# Patient Record
Sex: FEMALE | Race: WHITE | NOT HISPANIC OR LATINO | Employment: FULL TIME | URBAN - METROPOLITAN AREA
[De-identification: names, ages, dates, MRNs, and addresses within clinical notes are randomized per-mention and may not be internally consistent; named-entity substitution may affect disease eponyms.]

---

## 2017-02-03 ENCOUNTER — ALLSCRIPTS OFFICE VISIT (OUTPATIENT)
Dept: OTHER | Facility: OTHER | Age: 42
End: 2017-02-03

## 2017-02-03 ENCOUNTER — APPOINTMENT (OUTPATIENT)
Dept: LAB | Facility: HOSPITAL | Age: 42
End: 2017-02-03
Payer: COMMERCIAL

## 2017-02-03 DIAGNOSIS — R68.89 OTHER GENERAL SYMPTOMS AND SIGNS: ICD-10-CM

## 2017-02-03 LAB
FLUAV AG SPEC QL IA: NEGATIVE
FLUAV AG SPEC QL: NORMAL
FLUBV AG SPEC QL: NORMAL
INFLUENZA B AG (HISTORICAL): NEGATIVE
RSV B RNA SPEC QL NAA+PROBE: NORMAL

## 2017-02-03 PROCEDURE — 87798 DETECT AGENT NOS DNA AMP: CPT

## 2017-02-06 ENCOUNTER — GENERIC CONVERSION - ENCOUNTER (OUTPATIENT)
Dept: OTHER | Facility: OTHER | Age: 42
End: 2017-02-06

## 2017-03-21 ENCOUNTER — GENERIC CONVERSION - ENCOUNTER (OUTPATIENT)
Dept: OTHER | Facility: OTHER | Age: 42
End: 2017-03-21

## 2017-03-22 ENCOUNTER — ALLSCRIPTS OFFICE VISIT (OUTPATIENT)
Dept: OTHER | Facility: OTHER | Age: 42
End: 2017-03-22

## 2017-03-22 LAB
BACTERIA UR QL AUTO: NORMAL
CLUE CELL (HISTORICAL): NORMAL
HYPHAL YEAST (HISTORICAL): NORMAL
KOH PREP (HISTORICAL): NORMAL
PH UR STRIP.AUTO: 5.5 [PH]
TRICHOMONAS (HISTORICAL): NORMAL
YEAST (HISTORICAL): NORMAL

## 2017-11-07 ENCOUNTER — LAB REQUISITION (OUTPATIENT)
Dept: LAB | Facility: HOSPITAL | Age: 42
End: 2017-11-07
Payer: COMMERCIAL

## 2017-11-07 ENCOUNTER — ALLSCRIPTS OFFICE VISIT (OUTPATIENT)
Dept: OTHER | Facility: OTHER | Age: 42
End: 2017-11-07

## 2017-11-07 DIAGNOSIS — Z01.419 ENCOUNTER FOR GYNECOLOGICAL EXAMINATION WITHOUT ABNORMAL FINDING: ICD-10-CM

## 2017-11-07 DIAGNOSIS — Z11.51 ENCOUNTER FOR SCREENING FOR HUMAN PAPILLOMAVIRUS (HPV): ICD-10-CM

## 2017-11-07 LAB
BACTERIA UR QL AUTO: NORMAL
CLUE CELL (HISTORICAL): NORMAL
HYPHAL YEAST (HISTORICAL): NORMAL
KOH PREP (HISTORICAL): NORMAL
PH UR STRIP.AUTO: 4.5 [PH]
TRICHOMONAS (HISTORICAL): NORMAL
YEAST (HISTORICAL): NORMAL

## 2017-11-07 PROCEDURE — 87624 HPV HI-RISK TYP POOLED RSLT: CPT | Performed by: PHYSICIAN ASSISTANT

## 2017-11-07 PROCEDURE — G0145 SCR C/V CYTO,THINLAYER,RESCR: HCPCS | Performed by: PHYSICIAN ASSISTANT

## 2017-11-09 LAB — HPV RRNA GENITAL QL NAA+PROBE: NORMAL

## 2017-11-13 LAB
LAB AP GYN PRIMARY INTERPRETATION: NORMAL
LAB AP LMP: NORMAL
Lab: NORMAL

## 2017-11-23 NOTE — PROGRESS NOTES
Assessment  1  Encounter for gynecological examination without abnormal finding (V72 31) (Z01 419)   2  Vaginal irritation (623 9) (N89 8)    Plan  Encounter for screening mammogram for malignant neoplasm of breast    · * MAMMO SCREENING BILATERAL W CAD; Status:Active; Requested for:15Bxr4236;    Perform:[facility]; IAJ:36WJX2491; Last Updated By:Aaliyah Paul; 11/7/2017 9:54:23 AM;Ordered;For:Encounter for screening mammogram for malignant neoplasm of breast; Ordered By:Lukens, Vivien Hamman;  Vaginal irritation    · 97 Karmen Frankel; Status:Complete;   Done: 78MGZ5801 09:20AM   Performed: In Office; BBK:05NGV7319;GHUAYZM; Today;irritation; Ordered By:Lukens, Vivien Hamman; Discussion/Summary  healthy adult female the risks and benefits of cervical cancer screening were discussed HPV and Pap Co-testing Done Today Breast cancer screening: the risks and benefits of breast cancer screening were discussed, monthly self breast exam was advised and mammogram has been ordered  Advice and education were given regarding weight bearing exercise, calcium supplements and vitamin D supplements  Chief Complaint  Pt is her yearly exam, c/o vaginal irritation and discharge  History of Present Illness  HPI: 36year old white female here for yearly exam, no complaints  She does note off and on vaginal irritation  She has regular periods that are not heavy or crampy  She uses no contraception and is happy with this  She says it took a long time to get pregnant with her children  GYN HM, Adult Female [facility]: The patient is being seen for a health maintenance evaluation  The last health maintenance visit was 1 year(s) ago  General Health: The patient's health since the last visit is described as good  Lifestyle:  She consumes a diverse and healthy diet  -- She does not have any weight concerns  -- She does not exercise regularly  -- She does not use tobacco -- She consumes alcohol  She reports occasional alcohol use  -- She denies drug use  Reproductive health: the patient is premenopausal--   she reports no menstrual problems  -- she uses contraception  For contraception, she uses withdrawal -- she is sexually active  -- pregnancy history: G 2P 2,-- 2    Screening: Cervical cancer screening includes a pap smear performed 2/19/2014,neg  -- and-- human papilloma virus screening performed 2/19/2014,neg  Breast cancer screening includes a mammogram performed 12/27/2016 WNL  Colorectal cancer screening includes no previous colonoscopy  Active Problems    1  Abnormal blood chemistry (790 6) (R79 9)   2  Allergic rhinitis (477 9) (J30 9)   3  Anxiety disorder (300 00) (F41 9)   4  Atypical chest pain (786 59) (R07 89)   5  Blepharitis, unspecified laterality   6  Cervicalgia (723 1) (M54 2)   7  Classic migraine with aura (346 00) (G43 109)   8  Depression with anxiety (300 4) (F41 8)   9  Encounter for gynecological examination without abnormal finding (V72 31) (Z01 419)   10  Encounter for screening mammogram for malignant neoplasm of breast (V76 12)  (Z12 31)   11  History of allergy (V15 09) (Z88 9)   12  Hypothyroidism (244 9) (E03 9)   13  Insomnia (780 52) (G47 00)   14  Intolerance to cold (780 99) (R68 89)   15  Nonspecific abnormal results of endocrine function study (794 6) (R94 7)   16  Simple goiter (240 0) (E04 0)   17  Tension type headache (339 10) (G44 209)   18   Vertigo (780 4) (R42)    Past Medical History   · History of Age At First Period 14 Years Old (Menarche)   · History of Anxiety (300 00) (F41 9)   · History of Atypical chest pain (786 59) (R07 89)   · History of Colposcopy Cervix With Biopsy(S)   · History of Dysplastic Nevus (238 2)   · History of allergy (V15 09) (Z88 9)   · History of dermatitis (V13 3) (Z87 2)   · History of hypothyroidism (V12 29) (Z86 39)   · History of menorrhagia (V13 29) (Z87 42)   · History of nausea (V12 79) (S71 554)   · History of sinusitis (V12 69) (Z87 09)   · History of vertigo (V12 49) (Z87 898)   · History of Menometrorrhagia (626 2) (N92 1)   · History of Nits / Moving Insects In Hair   · Normal delivery (650) (O80,Z37  9)   · History of Previously Pregnant With 2  Normal Vaginal Deliveries   · History of Simple goiter (240 0) (E04 0)   · History of Summary Of Previous Pregnancies  2  (Total No )   · History of Vaginal candidiasis (112 1) (B37 3)    Surgical History   · History of Biopsy Breast Percutaneous Needle Core   · History of Biopsy Skin Each Additional Lesion   · History of Endometrial Biopsy By Suction   · History of Left Breast Lumpectomy    Family History  Mother    · Family history of Subarachnoid Hemorrhage  Maternal Grandmother    · Family history of Breast Cancer (V16 3)   · Family history of Heart Disease (V17 49)  Maternal Grandfather    · Family history of Colon Cancer (V16 0)   · Family history of Lung Cancer (V16 1)  Paternal Grandfather    · Family history of Acute Myocardial Infarction (V17 3)    Social History     · Being A Social Drinker   · Birth Control Not Practiced   · Caffeine Use   · Denied: History of Drug Use   · Exercising Regularly   · Never A Smoker   · Sexually Active   · Withdrawal contraception    Current Meds   1  Acidophilus Probiotic TABS; Therapy: (Recorded:33Qto3917) to Recorded   2  Clindesse 2 % Vaginal Cream; INSERT 1 APPLICATORFUL INTRAVAGINALLY AT BEDTIME; Therapy: 48OBZ1530 to (Last Rx:2017)  Requested for: 2017 Ordered   3  ClonazePAM 0 5 MG Oral Tablet; one tablet in the morning, one tablet in the afternoon, 2 tablets at HS; Therapy: 84UPI6968 to (Last Rx:2017) Ordered   4  Fluconazole 200 MG Oral Tablet; TAKE 2 TABLET ONCE; Therapy: 63LGS5489 to (Evaluate:2017)  Requested for: 2017; Last Rx:2017 Ordered   5  PARoxetine HCl - 20 MG Oral Tablet; Take 1/2 tablet in the morning and 1 tablet at hs; Therapy: 57DZA1584 to (Last Rx:2017) Ordered   6   RisperiDONE 0 5 MG Oral Tablet; TAKE 1 TABLET AT BEDTIME; Therapy: 86WBP8819 to (Evaluate:05Mar2017); Last Rx:32Ozr0147 Ordered    Allergies  1  No Known Drug Allergies    Vitals   Recorded: 95RPC8092 20:81JL   Systolic 800, LUE, Sitting   Diastolic 70, LUE, Sitting   Height 5 ft 5 5 in   Weight 146 lb    BMI Calculated 23 93   BSA Calculated 1 74   LMP 30Oct2017       Physical Exam   Constitutional  General appearance: No acute distress, well appearing and well nourished  Neck  Neck: Normal, supple, trachea midline, no masses  Genitourinary  External genitalia: Normal and no lesions appreciated  Vagina: Normal, no lesions or dryness appreciated  Urethra: Normal    Urethral meatus: Normal    Bladder: Normal, soft, non-tender and no prolapse or masses appreciated  Cervix: Normal, no palpable masses  Uterus: Normal, non-tender, not enlarged, and no palpable masses  Adnexa/parametria: Normal, non-tender and no fullness or masses appreciated  Chest  Breasts: Normal and no dimpling or skin changes noted  Abdomen  Abdomen: Normal, non-tender, and no organomegaly noted         Results/Data  [facility] [id number] 09:20AM Master Mendenhall     Test Name Result Flag Reference   BACTERIA neg     CLUE CELL neg     TRICHOMONAS neg     YEAST WITH HYPHAE neg     YEAST neg     [place] 30 UA 4 5     KOH PREP neg whiff         Future Appointments    Date/Time Provider Specialty Site   11/08/2018 08:20 AM Master Mendenhall [facility] Obstetrics/Gynecology [facility]       Signatures   Electronically signed by : Ameya Rosales [facility]; Nov 7 2017  9:21AM EST                       (Author)    Electronically signed by : PONCHO Stafford ; Nov 22 2017 10:37AM EST                       (Author)

## 2017-12-27 DIAGNOSIS — Z12.31 ENCOUNTER FOR SCREENING MAMMOGRAM FOR MALIGNANT NEOPLASM OF BREAST: ICD-10-CM

## 2018-01-04 ENCOUNTER — HOSPITAL ENCOUNTER (OUTPATIENT)
Dept: RADIOLOGY | Facility: HOSPITAL | Age: 43
Discharge: HOME | End: 2018-01-04
Payer: COMMERCIAL

## 2018-01-04 DIAGNOSIS — Z12.31 ENCOUNTER FOR SCREENING MAMMOGRAM FOR MALIGNANT NEOPLASM OF BREAST: ICD-10-CM

## 2018-01-04 PROCEDURE — 77067 SCR MAMMO BI INCL CAD: CPT

## 2018-01-10 NOTE — MISCELLANEOUS
Message   Recorded as Task   Date: 03/21/2017 08:54 AM, Created By: Khadijah Mcarthur   Task Name: Follow Up   Assigned To: Maribel Baxter   Regarding Patient: Maximiliano Corey, Status: Active   Comment:    FerminKylah jacobs - 21 Mar 2017 8:54 AM     TASK CREATED  pt called stating she has a problem with chronic bv and yeast  jane macdonald treated her with diflucan 200mg times 14 days and metronidazole for bv    she states jane told her she will have a problem getting rid of symptoms   s&s began 4 days ago    fishy odor and white clumpy yeast disch  do you want her to come in or just rx    states 2 diflucan never cure symptoms  to [employer] [facility], [address]        Active Problems    1  Abnormal blood chemistry (790 6) (R79 9)   2  Acute sinusitis (461 9) (J01 90)   3  Allergic rhinitis (477 9) (J30 9)   4  Anxiety disorder (300 00) (F41 9)   5  Atypical chest pain (786 59) (R07 89)   6  Blepharitis, unspecified laterality   7  Cervicalgia (723 1) (M54 2)   8  Classic migraine with aura (346 00) (G43 109)   9  Depression with anxiety (300 4) (F41 8)   10  Encounter for gynecological examination without abnormal finding (V72 31) (Z01 419)   11  Encounter for screening mammogram for malignant neoplasm of breast (V76 12)    (Z12 31)   12  Flu-like symptoms (780 99) (R68 89)   13  History of allergy (V15 09) (Z88 9)   14  Hypothyroidism (244 9) (E03 9)   15  Insomnia (780 52) (G47 00)   16  Intolerance to cold (780 99) (R68 89)   17  Irregular menstrual cycle (626 4) (N92 6)   18  Nonspecific abnormal results of endocrine function study (794 6) (R94 7)   19  Palpitations (785 1) (R00 2)   20  Postnasal drip (784 91) (R09 82)   21  Simple goiter (240 0) (E04 0)   22  Tension type headache (339 10) (G44 209)   23  Vertigo (780 4) (R42)    Current Meds   1  Acidophilus Probiotic TABS; Therapy: (Recorded:62Hqw0757) to Recorded   2  ALPRAZolam 0 5 MG Oral Tablet; TAKE 1/2 TO 1 TAB ONCE A DAY AS NEEDED FOR   ANXIETY;    Therapy: 94LRE2456 to (Evaluate:21Mcn2476); Last Rx:24Jun2016 Ordered   3  ClonazePAM 0 5 MG Oral Tablet; one tablet in the morning, one tablet in the afternoon, 2   tablets at HS; Therapy: 30MIB7460 to (Last Rx:62Jgl9570) Ordered   4  Fluconazole 200 MG Oral Tablet (Diflucan); TAKE 2 TABLET ONCE; Therapy: 64RFQ5517 to (Suzette Fess)  Requested for: 13TET8219; Last   Rx:03Feb2017 Ordered   5  PARoxetine HCl - 20 MG Oral Tablet (Paxil); Take 1/2 tablet in the morning and 1 tablet   at hs;   Therapy: 94TWB8085 to (Last Rx:03Feb2017) Ordered   6  RisperiDONE 0 5 MG Oral Tablet; TAKE 1 TABLET AT BEDTIME; Therapy: 23ZZY5136 to (Evaluate:05Mar2017); Last Rx:77Fzo6671 Ordered    Allergies    1   No Known Drug Allergies    Signatures   Electronically signed by : Amina Walden, ; Mar 21 2017  8:54AM EST                       (Author)
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
## 2018-01-11 NOTE — MISCELLANEOUS
Message   Recorded as Task   Date: 03/21/2017 08:54 AM, Created By: Abad Barnes   Task Name: Follow Up   Assigned To: Alethea Simmons   Regarding Patient: Lee Ro, Status: In Progress   Comment:    Carmen Christensene - 21 Mar 2017 8:54 AM     TASK CREATED  pt called stating she has a problem with chronic bv and yeast  jane torres treated her with diflucan 200mg times 14 days and metronidazole for bv    she states jane told her she will have a problem getting rid of symptoms   s&s began 4 days ago    fishy odor and white clumpy yeast disch  do you want her to come in or just rx    states 2 diflucan never cure symptoms  to [facility]s   Kylah Christensen - 21 Mar 2017 8:54 AM     TASK REASSIGNED: Previously Assigned To GEORGINA GYN,Team   Bk Snell - 21 Mar 2017 9:00 AM     TASK REPLIED TO: Previously Assigned To Simran Davidson  last time she was seen her cultures were completely negative  She should come in for cultures  Kylah Christensen - 21 Mar 2017 9:34 AM     TASK IN PROGRESS   Kylah Christensen - 21 Mar 2017 9:35 AM     TASK EDITED  no apts today, pt wants to be seen tomorrow    3pm with bk        Active Problems    1  Abnormal blood chemistry (790 6) (R79 9)   2  Acute sinusitis (461 9) (J01 90)   3  Allergic rhinitis (477 9) (J30 9)   4  Anxiety disorder (300 00) (F41 9)   5  Atypical chest pain (786 59) (R07 89)   6  Blepharitis, unspecified laterality   7  Cervicalgia (723 1) (M54 2)   8  Classic migraine with aura (346 00) (G43 109)   9  Depression with anxiety (300 4) (F41 8)   10  Encounter for gynecological examination without abnormal finding (V72 31) (Z01 419)   11  Encounter for screening mammogram for malignant neoplasm of breast (V76 12)    (Z12 31)   12  Flu-like symptoms (780 99) (R68 89)   13  History of allergy (V15 09) (Z88 9)   14  Hypothyroidism (244 9) (E03 9)   15  Insomnia (780 52) (G47 00)   16  Intolerance to cold (780 99) (R68 89)   17   Irregular menstrual cycle (626  4) (N92 6)   18  Nonspecific abnormal results of endocrine function study (794 6) (R94 7)   19  Palpitations (785 1) (R00 2)   20  Postnasal drip (784 91) (R09 82)   21  Simple goiter (240 0) (E04 0)   22  Tension type headache (339 10) (G44 209)   23  Vertigo (780 4) (R42)    Current Meds   1  Acidophilus Probiotic TABS; Therapy: (Recorded:74Huz8715) to Recorded   2  ALPRAZolam 0 5 MG Oral Tablet; TAKE 1/2 TO 1 TAB ONCE A DAY AS NEEDED FOR   ANXIETY; Therapy: 60NYR1771 to (Evaluate:60Bof7888); Last Rx:24Jun2016 Ordered   3  ClonazePAM 0 5 MG Oral Tablet; one tablet in the morning, one tablet in the afternoon, 2   tablets at HS; Therapy: 67YGG1011 to (Last Rx:01Jme4933) Ordered   4  Fluconazole 200 MG Oral Tablet (Diflucan); TAKE 2 TABLET ONCE; Therapy: 18ESO6644 to (Shaun Simon)  Requested for: 81UQW2492; Last   Rx:55Llr0817 Ordered   5  PARoxetine HCl - 20 MG Oral Tablet (Paxil); Take 1/2 tablet in the morning and 1 tablet   at hs;   Therapy: 18KST5231 to (Last Rx:57Qoj8065) Ordered   6  RisperiDONE 0 5 MG Oral Tablet; TAKE 1 TABLET AT BEDTIME; Therapy: 19JCN2852 to (Evaluate:05Mar2017); Last Rx:37Zck3910 Ordered    Allergies    1   No Known Drug Allergies    Signatures   Electronically signed by : Caprice Noland, ; Mar 21 2017  9:35AM EST                       (Author)

## 2018-01-12 VITALS
HEIGHT: 66 IN | DIASTOLIC BLOOD PRESSURE: 70 MMHG | SYSTOLIC BLOOD PRESSURE: 120 MMHG | WEIGHT: 146 LBS | BODY MASS INDEX: 23.46 KG/M2

## 2018-01-13 VITALS — DIASTOLIC BLOOD PRESSURE: 70 MMHG | SYSTOLIC BLOOD PRESSURE: 110 MMHG | WEIGHT: 142 LBS | BODY MASS INDEX: 22.92 KG/M2

## 2018-01-13 NOTE — MISCELLANEOUS
Message  Return to work or school:   Kraig Shah is under my professional care  She was seen in my office on 11/8/17   She is able to return to work on  11/8/17      Joe Chambers PAC          Signatures   Electronically signed by : Joe Chambers, [facility]; Nov 7 2017  9:33AM EST                       (Author)

## 2018-01-14 VITALS
HEART RATE: 64 BPM | SYSTOLIC BLOOD PRESSURE: 102 MMHG | DIASTOLIC BLOOD PRESSURE: 70 MMHG | TEMPERATURE: 98.9 F | WEIGHT: 134 LBS | BODY MASS INDEX: 21.53 KG/M2 | HEIGHT: 66 IN | RESPIRATION RATE: 16 BRPM

## 2018-01-14 NOTE — RESULT NOTES
Verified Results  (1) INFLUENZA A/B AND RSV, PCR 32Exn0766 05:16PM [facility] Order Number: [id number]     Test Name Result Flag Reference   INFLUENZA A/MATRIX None Detected  None Detected   INFLUENZA B None Detected  None Detected   RESP SYNCYTIAL VIRUS None Detected  None Detected

## 2018-07-02 ENCOUNTER — OFFICE VISIT (OUTPATIENT)
Dept: FAMILY MEDICINE CLINIC | Facility: CLINIC | Age: 43
End: 2018-07-02
Payer: COMMERCIAL

## 2018-07-02 VITALS
WEIGHT: 152.4 LBS | HEART RATE: 72 BPM | TEMPERATURE: 99.5 F | SYSTOLIC BLOOD PRESSURE: 120 MMHG | RESPIRATION RATE: 12 BRPM | BODY MASS INDEX: 24.97 KG/M2 | DIASTOLIC BLOOD PRESSURE: 88 MMHG

## 2018-07-02 DIAGNOSIS — L55.9 SUNBURN: Primary | ICD-10-CM

## 2018-07-02 PROCEDURE — 99213 OFFICE O/P EST LOW 20 MIN: CPT | Performed by: NURSE PRACTITIONER

## 2018-07-02 RX ORDER — CLONAZEPAM 0.5 MG/1
TABLET ORAL
COMMUNITY
Start: 2017-02-03

## 2018-07-02 RX ORDER — FLUCONAZOLE 200 MG/1
2 TABLET ORAL
COMMUNITY
Start: 2017-02-03 | End: 2018-07-02 | Stop reason: ALTCHOICE

## 2018-07-02 RX ORDER — RISPERIDONE 0.5 MG/1
1 TABLET, FILM COATED ORAL
COMMUNITY
Start: 2017-02-03

## 2018-07-02 RX ORDER — PAROXETINE HYDROCHLORIDE 40 MG/1
TABLET, FILM COATED ORAL
Refills: 0 | COMMUNITY
Start: 2018-05-14 | End: 2019-12-20 | Stop reason: ALTCHOICE

## 2018-07-02 NOTE — LETTER
July 2, 2018     Patient: Herb Warren   YOB: 1975   Date of Visit: 7/2/2018       To Whom it May Concern:    Luis Bashir is under my professional care  She was seen in my office on 7/2/2018  She may return to work on 7/3/18  If you have any questions or concerns, please don't hesitate to call           Sincerely,          ESTEVAN Restrepo        CC: No Recipients

## 2018-07-02 NOTE — PROGRESS NOTES
Chief Complaint   Patient presents with    Sunburn     Sunburn and rash on chest, shoulders, neck, and back since last 2 days  Assessment/Plan:    Sunburn-  Apply Aloe or Calamine Lotion OTC, cool compresses prn   Clean areas daily with gentle soap and water  Avoid hot showers  Avoid direct sunlight (use SPF 30 at least when in direct sunlight or keep body covered)  OTC Ibuprofen prn   Call if symptoms worsen or persist      Diagnoses and all orders for this visit:    Sunburn          Subjective:      Patient ID: Annabelle Muro is a 36 y o  female  HPI   Pt presents by herself today for an acute visit     C/O having sunburn on her upper chest, shoulders and upper back  Was at the beach last [place], lots of direct sunlight  She was applying sunscreen (SPF 30), didn't feel like she was burning  Had small blisters that developed over this past weekend  Skin now peeling in above mentioned areas   No fevers, chills, drainage     Has been applying her regular body lotion to her skin  Has not tried Aloe       The following portions of the patient's history were reviewed and updated as appropriate: allergies, current medications, past medical history, past social history and problem list     Review of Systems   Constitutional: Negative for chills, diaphoresis, fatigue and fever  Respiratory: Negative for chest tightness, shortness of breath and wheezing  Cardiovascular: Negative for chest pain, palpitations and leg swelling  Gastrointestinal: Negative for abdominal pain, diarrhea, nausea and vomiting  Skin: Positive for rash  Neurological: Negative for dizziness  Hematological: Negative for adenopathy  Objective:      /88 (BP Location: Left arm, Patient Position: Sitting, Cuff Size: Standard)   Pulse 72   Temp 99 5 °F (37 5 °C) (Tympanic)   Resp 12   Wt 69 1 kg (152 lb 6 4 oz)   BMI 24 97 kg/m²          Physical Exam   Constitutional: She is oriented to person, place, and time   She appears well-developed and well-nourished  No distress  HENT:   Head: Normocephalic and atraumatic  Eyes: Pupils are equal, round, and reactive to light  Cardiovascular: Normal rate, regular rhythm and normal heart sounds  No murmur heard  Pulmonary/Chest: Effort normal and breath sounds normal  No respiratory distress  She has no wheezes  Neurological: She is alert and oriented to person, place, and time  Skin: Skin is warm and dry  She is not diaphoretic  Sunburn, peeling skin to upper chest, B/L shoulders (anterior and posteriorly) and upper mid back thoracic back   No blistering or drainage   No signs of infection    Psychiatric: She has a normal mood and affect

## 2018-07-02 NOTE — PATIENT INSTRUCTIONS
Apply Aloe or Calamine Lotion OTC, cool compresses    Clean areas daily with gentle soap and water  Avoid hot showers  Avoid direct sunlight (use SPF 30 at least when in direct sunlight or keep body covered)  OTC Ibuprofen if needed  Call if symptoms worsen or persist

## 2018-11-08 ENCOUNTER — ANNUAL EXAM (OUTPATIENT)
Dept: OBGYN CLINIC | Facility: CLINIC | Age: 43
End: 2018-11-08
Payer: COMMERCIAL

## 2018-11-08 VITALS
DIASTOLIC BLOOD PRESSURE: 72 MMHG | BODY MASS INDEX: 27.99 KG/M2 | WEIGHT: 168 LBS | HEIGHT: 65 IN | SYSTOLIC BLOOD PRESSURE: 110 MMHG

## 2018-11-08 DIAGNOSIS — R92.2 DENSE BREAST TISSUE: ICD-10-CM

## 2018-11-08 DIAGNOSIS — Z01.419 ENCOUNTER FOR GYNECOLOGICAL EXAMINATION WITHOUT ABNORMAL FINDING: ICD-10-CM

## 2018-11-08 DIAGNOSIS — Z12.31 ENCOUNTER FOR SCREENING MAMMOGRAM FOR MALIGNANT NEOPLASM OF BREAST: Primary | ICD-10-CM

## 2018-11-08 PROCEDURE — S0612 ANNUAL GYNECOLOGICAL EXAMINA: HCPCS | Performed by: PHYSICIAN ASSISTANT

## 2018-11-08 RX ORDER — DIPHENOXYLATE HYDROCHLORIDE AND ATROPINE SULFATE 2.5; .025 MG/1; MG/1
1 TABLET ORAL DAILY
COMMUNITY

## 2018-11-08 NOTE — PROGRESS NOTES
Cristhian Adrian  1975      CC:  Yearly exam    S:  39 y o  female here for yearly exam  Her cycles are regular, not heavy or crampy  She is sexually active  She uses nothing for contraception and she is happy with this  Her 15year old son recently underwent bowel resection and appendectomy for his Crohn's disease  Last Pap 11/7/2017 neg/neg  Last Mammo 1/4/18 neg      Current Outpatient Prescriptions:     clonazePAM (KlonoPIN) 0 5 mg tablet, Take by mouth, Disp: , Rfl:     multivitamin (THERAGRAN) TABS, Take 1 tablet by mouth daily, Disp: , Rfl:     PARoxetine (PAXIL) 40 MG tablet, TAKE 1/2 BY MOUTH TWICE A DAY (PLAN LIMITS TO 30-DS), Disp: , Rfl: 0    risperiDONE (RisperDAL) 0 5 mg tablet, Take 1 tablet by mouth, Disp: , Rfl:   Social History     Social History    Marital status: /Civil Union     Spouse name: N/A    Number of children: N/A    Years of education: N/A     Occupational History    Not on file       Social History Main Topics    Smoking status: Former Smoker    Smokeless tobacco: Former User      Comment: Quit    Alcohol use Yes      Comment: social    Drug use: No    Sexual activity: Yes     Partners: Male     Birth control/ protection: None      Comment: withdrawl method     Other Topics Concern    Not on file     Social History Narrative    Caffeine use    Exercises regularly         Family History   Problem Relation Age of Onset    Subarachnoid hemorrhage Mother     Breast cancer Maternal Grandmother     Heart disease Maternal Grandmother     Colon cancer Maternal Grandfather     Lung cancer Maternal Grandfather     Heart attack Paternal Grandfather     No Known Problems Father     Lung cancer Paternal Grandmother     Alzheimer's disease Paternal [de-identified]     Crohn's disease Son       Past Medical History:   Diagnosis Date    Allergy     Anxiety     Anxiety     Depression     Dermatitis     Dysplastic nevus     Insomnia     Menometrorrhagia     Vertigo         O:  Blood pressure 110/72, height 5' 5" (1 651 m), weight 76 2 kg (168 lb), last menstrual period 10/16/2018  Patient appears well and is not in distress  Neck is supple without masses  Breasts are symmetrical without mass, tenderness, nipple discharge, skin changes or adenopathy  Abdomen is soft and nontender without masses  External genitals are normal without lesions or rashes  Vagina is normal without discharge or bleeding  Cervix is normal without discharge or lesion  Uterus is normal, mobile, nontender without palpable mass  Adnexa are normal, nontender, without palpable mass  A:  Yearly exam      P:   Pap due 2022   Mammo slip provided    RTO one year for yearly exam or sooner as needed

## 2019-03-18 ENCOUNTER — OFFICE VISIT (OUTPATIENT)
Dept: FAMILY MEDICINE CLINIC | Facility: CLINIC | Age: 44
End: 2019-03-18
Payer: COMMERCIAL

## 2019-03-18 VITALS
BODY MASS INDEX: 26.82 KG/M2 | WEIGHT: 161 LBS | HEART RATE: 86 BPM | HEIGHT: 65 IN | TEMPERATURE: 98.8 F | DIASTOLIC BLOOD PRESSURE: 64 MMHG | SYSTOLIC BLOOD PRESSURE: 106 MMHG | OXYGEN SATURATION: 97 % | RESPIRATION RATE: 16 BRPM

## 2019-03-18 DIAGNOSIS — R68.89 FLU-LIKE SYMPTOMS: Primary | ICD-10-CM

## 2019-03-18 PROCEDURE — 1036F TOBACCO NON-USER: CPT | Performed by: NURSE PRACTITIONER

## 2019-03-18 PROCEDURE — 3008F BODY MASS INDEX DOCD: CPT | Performed by: NURSE PRACTITIONER

## 2019-03-18 PROCEDURE — 99213 OFFICE O/P EST LOW 20 MIN: CPT | Performed by: NURSE PRACTITIONER

## 2019-03-18 RX ORDER — OSELTAMIVIR PHOSPHATE 75 MG/1
75 CAPSULE ORAL EVERY 12 HOURS SCHEDULED
Qty: 10 CAPSULE | Refills: 0 | Status: SHIPPED | OUTPATIENT
Start: 2019-03-18 | End: 2019-03-23

## 2019-03-18 RX ORDER — LOXAPINE SUCCINATE 5 MG/1
TABLET ORAL
Refills: 2 | COMMUNITY
Start: 2019-03-08 | End: 2019-11-12 | Stop reason: ALTCHOICE

## 2019-03-18 NOTE — PROGRESS NOTES
Chief Complaint   Patient presents with    Cold Like Symptoms     Nausea, Chills     Health Maintenance   Topic Date Due    BMI: Followup Plan  08/14/1992    DTaP,Tdap,and Td Vaccines (1 - Tdap) 08/14/1995    INFLUENZA VACCINE  03/18/2020 (Originally 7/1/2018)    Depression Screening PHQ  03/18/2020    BMI: Adult  03/18/2020    HEPATITIS B VACCINES  Aged Out     Assessment/Plan:    Flu like symptoms- declined to be swabbed for influenza by PCR  Based off of symptoms and exam today, we will treat with Tamilfu  Increase PO fluids and rest   Warm, salt water gargles, throat lozenges  Saline nasal spray prn  OTC Tylenol prn, max of 3g/24 hours  Humidifier in bedroom  Declined a cough suppressant today  Call office if symptoms worsen or persist     Diagnoses and all orders for this visit:    Flu-like symptoms  -     oseltamivir (TAMIFLU) 75 mg capsule; Take 1 capsule (75 mg total) by mouth every 12 (twelve) hours for 5 days    Other orders  -     loxapine (LOXITANE) 5 mg capsule; TK 1 C PO HS          Subjective:      Patient ID: Jodie Cummings is a 39 y o  female  HPI     Pt presents by herself today for an acute visit   C/o body aches which started yesterday morning   Feeling warm with chills but isn't checking temperatures at home  Last had Tylenol around 2pm (had one tab of the ES)  +nausea and vomiting x 1 yesterday   No nausea today  No abdominal pain   No diarrhea or constipation   +scratchy throat with a dry cough   +dull headache   +nasal congestion   No SOB or wheezing     She did not get her flu vaccine this year    Both kids are sick at home    Non smoker  Denies H/O asthma         The following portions of the patient's history were reviewed and updated as appropriate: allergies, current medications, past medical history, past social history and problem list     Review of Systems   Constitutional: Positive for chills, fatigue and fever  Negative for unexpected weight change     HENT: Positive for congestion and sore throat  Negative for ear pain, hearing loss, postnasal drip, rhinorrhea and sinus pressure  Eyes: Negative for visual disturbance  Respiratory: Positive for cough  Negative for chest tightness, shortness of breath and wheezing  Cardiovascular: Negative for chest pain, palpitations and leg swelling  Gastrointestinal: Positive for nausea and vomiting  Negative for abdominal pain, blood in stool, constipation and diarrhea  Genitourinary: Negative for dysuria  Musculoskeletal: Positive for myalgias  Negative for arthralgias  Skin: Negative for rash and wound  Neurological: Positive for headaches  Negative for dizziness, weakness and numbness  Hematological: Negative for adenopathy  Does not bruise/bleed easily  Objective:      /64 (BP Location: Left arm, Patient Position: Sitting, Cuff Size: Adult)   Pulse 86   Temp 98 8 °F (37 1 °C) (Tympanic)   Resp 16   Ht 5' 5" (1 651 m)   Wt 73 kg (161 lb)   SpO2 97%   BMI 26 79 kg/m²          Physical Exam   Constitutional: She is oriented to person, place, and time  She appears well-developed and well-nourished  No distress  HENT:   Head: Normocephalic and atraumatic  Right Ear: Hearing, tympanic membrane, external ear and ear canal normal    Left Ear: Hearing, tympanic membrane, external ear and ear canal normal    Nose: Mucosal edema (erythematous) present  Right sinus exhibits no maxillary sinus tenderness and no frontal sinus tenderness  Left sinus exhibits no maxillary sinus tenderness and no frontal sinus tenderness  Mouth/Throat: Mucous membranes are normal  Posterior oropharyngeal erythema present  No oropharyngeal exudate, posterior oropharyngeal edema or tonsillar abscesses  Eyes: Pupils are equal, round, and reactive to light  Conjunctivae are normal    Neck: Normal range of motion  Neck supple  No thyromegaly present  Cardiovascular: Normal rate, regular rhythm and normal heart sounds  No murmur heard  Pulmonary/Chest: Effort normal and breath sounds normal  No accessory muscle usage or stridor  No respiratory distress  She has no decreased breath sounds  She has no wheezes  She has no rhonchi  She has no rales  Abdominal: Soft  Bowel sounds are normal  She exhibits no distension  There is no tenderness  Musculoskeletal: Normal range of motion  Lymphadenopathy:     She has no cervical adenopathy  Neurological: She is alert and oriented to person, place, and time  Skin: Skin is warm and dry  She is not diaphoretic  Psychiatric: She has a normal mood and affect

## 2019-03-18 NOTE — LETTER
March 18, 2019     Patient: Yobani Lai   YOB: 1975   Date of Visit: 3/18/2019       To Whom it May Concern:    Gerson Esposito is under my professional care  She was seen in my office on 3/18/2019  She may return to work on 3/21/19  If you have any questions or concerns, please don't hesitate to call           Sincerely,          ESTEVAN Evans        CC: No Recipients

## 2019-11-12 ENCOUNTER — ANNUAL EXAM (OUTPATIENT)
Dept: OBGYN CLINIC | Facility: CLINIC | Age: 44
End: 2019-11-12
Payer: COMMERCIAL

## 2019-11-12 VITALS
DIASTOLIC BLOOD PRESSURE: 74 MMHG | BODY MASS INDEX: 26.82 KG/M2 | SYSTOLIC BLOOD PRESSURE: 112 MMHG | HEIGHT: 65 IN | WEIGHT: 161 LBS

## 2019-11-12 DIAGNOSIS — Z12.31 ENCOUNTER FOR SCREENING MAMMOGRAM FOR MALIGNANT NEOPLASM OF BREAST: ICD-10-CM

## 2019-11-12 DIAGNOSIS — Z01.419 ENCNTR FOR GYN EXAM (GENERAL) (ROUTINE) W/O ABN FINDINGS: Primary | ICD-10-CM

## 2019-11-12 DIAGNOSIS — N64.4 BREAST PAIN, LEFT: ICD-10-CM

## 2019-11-12 PROCEDURE — S0612 ANNUAL GYNECOLOGICAL EXAMINA: HCPCS | Performed by: PHYSICIAN ASSISTANT

## 2019-11-12 NOTE — PROGRESS NOTES
Johanny Ku Nguyễn  1975      CC:  Yearly exam    S:  38 y o  female here for yearly exam  Her cycles are regular, not heavy or crampy  Sexual activity: She is sexually active with her  without pain, bleeding or dryness  Contraception: She uses withdrawal  for contraception  Last Pap 11/7/17 neg/neg  Last Mammo 1/4/18 neg    We reviewed [facility] guidelines for Pap testing today  She notes left breast pain in the region where she had a lumpectomy in the past for a benign mass       Family hx of breast cancer: MGM   Family hx of ovarian cancer: no  Family hx of colon cancer: MGF    Current Outpatient Medications:     clonazePAM (KlonoPIN) 0 5 mg tablet, Take by mouth, Disp: , Rfl:     multivitamin (THERAGRAN) TABS, Take 1 tablet by mouth daily, Disp: , Rfl:     PARoxetine (PAXIL) 40 MG tablet, TAKE 1/2 BY MOUTH TWICE A DAY (PLAN LIMITS TO 30-DS), Disp: , Rfl: 0    risperiDONE (RisperDAL) 0 5 mg tablet, Take 1 tablet by mouth, Disp: , Rfl:   Social History     Socioeconomic History    Marital status: /Civil Union     Spouse name: Not on file    Number of children: Not on file    Years of education: Not on file    Highest education level: Not on file   Occupational History    Not on file   Social Needs    Financial resource strain: Not on file    Food insecurity:     Worry: Not on file     Inability: Not on file    Transportation needs:     Medical: Not on file     Non-medical: Not on file   Tobacco Use    Smoking status: Current Some Day Smoker    Smokeless tobacco: Former User    Tobacco comment: Quit   Substance and Sexual Activity    Alcohol use: Yes     Frequency: Never     Drinks per session: 1 or 2     Binge frequency: Never     Comment: social    Drug use: No    Sexual activity: Yes     Partners: Male     Birth control/protection: None     Comment: withdrawl method   Lifestyle    Physical activity:     Days per week: Not on file     Minutes per session: Not on file    Stress: Not on file   Relationships    Social connections:     Talks on phone: Not on file     Gets together: Not on file     Attends Holiness service: Not on file     Active member of club or organization: Not on file     Attends meetings of clubs or organizations: Not on file     Relationship status: Not on file    Intimate partner violence:     Fear of current or ex partner: Not on file     Emotionally abused: Not on file     Physically abused: Not on file     Forced sexual activity: Not on file   Other Topics Concern    Not on file   Social History Narrative    Caffeine use    Exercises regularly     Family History   Problem Relation Age of Onset    Subarachnoid hemorrhage Mother     Breast cancer Maternal Grandmother     Heart disease Maternal Grandmother     Colon cancer Maternal Grandfather     Lung cancer Maternal Grandfather     Heart attack Paternal Grandfather     Prostate cancer Father     Lung cancer Paternal Grandmother     Alzheimer's disease Paternal Grandmother     Crohn's disease Son     Ulcerative colitis Son       Past Medical History:   Diagnosis Date    Allergy     Anxiety     Anxiety     Depression     Dermatitis     Dysplastic nevus     Insomnia     Menometrorrhagia     Vertigo         Review of Systems   Respiratory: Negative  Cardiovascular: Negative  Gastrointestinal: Negative for constipation and diarrhea  Genitourinary: Negative for difficulty urinating, pelvic pain, vaginal bleeding, vaginal discharge, itching or odor  O:  Blood pressure 112/74, height 5' 4 96" (1 65 m), weight 73 kg (161 lb), last menstrual period 10/11/2019  Patient appears well and is not in distress  Neck is supple without masses  Breasts are symmetrical without mass, tenderness, nipple discharge, skin changes or adenopathy  Abdomen is soft and nontender without masses  External genitals are normal without lesions or rashes    Urethral meatus and urethra are normal  Bladder is normal to palpation  Vagina is normal without discharge or bleeding  Cervix is normal without discharge or lesion  Uterus is normal, mobile, nontender without palpable mass  Adnexa are normal, nontender, without palpable mass  A:   Yearly exam     Breast pain  P:   Pap 2022   Check left dx mammo and US, right screening mammo    RTO one year for yearly exam or sooner as needed

## 2019-11-26 ENCOUNTER — HOSPITAL ENCOUNTER (OUTPATIENT)
Dept: ULTRASOUND IMAGING | Facility: CLINIC | Age: 44
Discharge: HOME | End: 2019-11-26
Payer: COMMERCIAL

## 2019-11-26 ENCOUNTER — HOSPITAL ENCOUNTER (OUTPATIENT)
Dept: MAMMOGRAPHY | Facility: CLINIC | Age: 44
Discharge: HOME | End: 2019-11-26
Payer: COMMERCIAL

## 2019-11-26 VITALS — WEIGHT: 161 LBS | BODY MASS INDEX: 27.49 KG/M2 | HEIGHT: 64 IN

## 2019-11-26 DIAGNOSIS — N64.4 BREAST PAIN, LEFT: ICD-10-CM

## 2019-11-26 PROCEDURE — G0279 TOMOSYNTHESIS, MAMMO: HCPCS

## 2019-11-26 PROCEDURE — 77066 DX MAMMO INCL CAD BI: CPT

## 2019-12-20 ENCOUNTER — OFFICE VISIT (OUTPATIENT)
Dept: FAMILY MEDICINE CLINIC | Facility: CLINIC | Age: 44
End: 2019-12-20
Payer: COMMERCIAL

## 2019-12-20 ENCOUNTER — TELEPHONE (OUTPATIENT)
Dept: FAMILY MEDICINE CLINIC | Facility: CLINIC | Age: 44
End: 2019-12-20

## 2019-12-20 VITALS
SYSTOLIC BLOOD PRESSURE: 110 MMHG | TEMPERATURE: 98.7 F | BODY MASS INDEX: 27.66 KG/M2 | RESPIRATION RATE: 14 BRPM | HEART RATE: 88 BPM | DIASTOLIC BLOOD PRESSURE: 70 MMHG | WEIGHT: 162 LBS | OXYGEN SATURATION: 98 % | HEIGHT: 64 IN

## 2019-12-20 DIAGNOSIS — R68.89 FLU-LIKE SYMPTOMS: ICD-10-CM

## 2019-12-20 DIAGNOSIS — R68.89 FLU-LIKE SYMPTOMS: Primary | ICD-10-CM

## 2019-12-20 PROCEDURE — 1036F TOBACCO NON-USER: CPT | Performed by: FAMILY MEDICINE

## 2019-12-20 PROCEDURE — 3008F BODY MASS INDEX DOCD: CPT | Performed by: FAMILY MEDICINE

## 2019-12-20 PROCEDURE — 99213 OFFICE O/P EST LOW 20 MIN: CPT | Performed by: FAMILY MEDICINE

## 2019-12-20 RX ORDER — OSELTAMIVIR PHOSPHATE 75 MG/1
75 CAPSULE ORAL EVERY 12 HOURS SCHEDULED
Qty: 10 CAPSULE | Refills: 0 | Status: SHIPPED | OUTPATIENT
Start: 2019-12-20 | End: 2019-12-25

## 2019-12-20 RX ORDER — PAROXETINE HYDROCHLORIDE 25 MG/1
TABLET, FILM COATED, EXTENDED RELEASE ORAL
Refills: 0 | COMMUNITY
Start: 2019-11-29

## 2019-12-20 RX ORDER — OSELTAMIVIR PHOSPHATE 75 MG/1
75 CAPSULE ORAL EVERY 12 HOURS SCHEDULED
Qty: 10 CAPSULE | Refills: 0 | Status: CANCELLED | OUTPATIENT
Start: 2019-12-20 | End: 2019-12-25

## 2019-12-20 RX ORDER — DEXTROMETHORPHAN HYDROBROMIDE AND PROMETHAZINE HYDROCHLORIDE 15; 6.25 MG/5ML; MG/5ML
SOLUTION ORAL
Qty: 240 ML | Refills: 0 | Status: SHIPPED | OUTPATIENT
Start: 2019-12-20 | End: 2021-11-22 | Stop reason: ALTCHOICE

## 2019-12-20 RX ORDER — OSELTAMIVIR PHOSPHATE 75 MG/1
75 CAPSULE ORAL EVERY 12 HOURS SCHEDULED
Qty: 10 CAPSULE | Refills: 0 | Status: SHIPPED | OUTPATIENT
Start: 2019-12-20 | End: 2019-12-20 | Stop reason: SDUPTHER

## 2019-12-20 NOTE — PROGRESS NOTES
Chief Complaint   Patient presents with    Fever    Chills    Generalized Body Aches     Health Maintenance   Topic Date Due    Pneumococcal Vaccine: Pediatrics (0 to 5 Years) and At-Risk Patients (6 to 59 Years) (1 of 1 - PPSV23) 08/14/1980    DTaP,Tdap,and Td Vaccines (1 - Tdap) 08/14/1985    HIV Screening  08/14/1989    BMI: Followup Plan  08/14/1992    Influenza Vaccine  03/18/2020 (Originally 7/1/2019)    BMI: Adult  11/26/2020    MAMMOGRAM  11/26/2020    Cervical Cancer Screening  11/07/2022    Pneumococcal Vaccine: 65+ Years (1 of 2 - PCV13) 08/14/2039    HIB Vaccine  Aged Out    Hepatitis B Vaccine  Aged Out    IPV Vaccine  Aged Out    Hepatitis A Vaccine  Aged Out    Meningococcal ACWY Vaccine  Aged Out    HPV Vaccine  Aged Out     BMI Counseling: Body mass index is 27 81 kg/m²  The BMI is above normal  Nutrition recommendations include decreasing overall calorie intake and 3-5 servings of fruits/vegetables daily  Assessment/Plan:    Flu-like symptoms  Patient was advised to increase fluid intake, take Tylenol PRN  for headache, fever  Start Tamiflu 75 mg 1 capsule twice daily for 5 days  Take romethazine DM cough syrup PRN for cough  Call office if symptoms persist or worsen  Diagnoses and all orders for this visit:    Flu-like symptoms  -     oseltamivir (TAMIFLU) 75 mg capsule; Take 1 capsule (75 mg total) by mouth every 12 (twelve) hours for 5 days  -     Promethazine-DM (PHENERGAN-DM) 6 25-15 mg/5 mL oral syrup; Take 1 tsp every 6 -8 hours as needed for cough    Other orders  -     PARoxetine (PAXIL-CR) 25 mg 24 hr tablet; TK 1 T PO HS          Subjective:      Patient ID: Urmila Stahl is a 38 y o  female  HPI     Patient presents to the office c/o nasal congestion, sore throat, headache, body aches, non-productive cough, fever, chills, mild diarrhea since yesterday  Patient has been taking Tylenol  Denies nausea, vomiting        Patient denies recent travel  She did not have flu shot this season  Smokes occasionally  The following portions of the patient's history were reviewed and updated as appropriate: allergies, past family history, past medical history, past social history, past surgical history and problem list     Review of Systems   Constitutional: Positive for appetite change (decreased appetite), chills, fatigue and fever  Negative for activity change  HENT: Positive for congestion and sore throat  Negative for ear pain, mouth sores, nosebleeds, sinus pressure and trouble swallowing  Eyes: Negative for pain, discharge, redness, itching and visual disturbance  Respiratory: Positive for cough  Negative for chest tightness, shortness of breath and wheezing  Cardiovascular: Negative for chest pain, palpitations and leg swelling  Gastrointestinal: Positive for diarrhea  Negative for abdominal pain, blood in stool, nausea and vomiting  Genitourinary: Negative for difficulty urinating, dysuria, flank pain, frequency and hematuria  Musculoskeletal: Positive for arthralgias and myalgias  Negative for joint swelling and neck pain  Skin: Negative for rash and wound  Neurological: Positive for headaches  Negative for dizziness  Hematological: Negative  Objective:      /70 (BP Location: Left arm, Patient Position: Sitting, Cuff Size: Adult)   Pulse 88   Temp 98 7 °F (37 1 °C) (Tympanic)   Resp 14   Ht 5' 4" (1 626 m)   Wt 73 5 kg (162 lb)   SpO2 98%   BMI 27 81 kg/m²          Physical Exam   Constitutional: She appears well-developed and well-nourished  HENT:   Head: Normocephalic and atraumatic  Right Ear: External ear normal    Left Ear: External ear normal    Nose: Nose normal    Pharyngeal erythema  No exudate  Eyes: Pupils are equal, round, and reactive to light  Conjunctivae are normal    Cardiovascular: Normal rate, regular rhythm and normal heart sounds  No murmur heard    Pulmonary/Chest: Effort normal and breath sounds normal    Abdominal: Soft  Bowel sounds are normal  There is no tenderness  Musculoskeletal: Normal range of motion  She exhibits no edema, tenderness or deformity  Skin: Skin is warm and dry  No rash noted  Psychiatric: She has a normal mood and affect  Nursing note and vitals reviewed

## 2019-12-20 NOTE — ASSESSMENT & PLAN NOTE
Patient was advised to increase fluid intake, take Tylenol PRN  for headache, fever  Start Tamiflu 75 mg 1 capsule twice daily for 5 days  Take romethazine DM cough syrup PRN for cough  Call office if symptoms persist or worsen

## 2019-12-20 NOTE — TELEPHONE ENCOUNTER
[id number] [facility] is out of the Tamiflu - please send to [place] on file  Please call patient at [phone] when it has been sent

## 2020-11-13 ENCOUNTER — ANNUAL EXAM (OUTPATIENT)
Dept: OBGYN CLINIC | Facility: CLINIC | Age: 45
End: 2020-11-13
Payer: COMMERCIAL

## 2020-11-13 VITALS
WEIGHT: 169.8 LBS | TEMPERATURE: 97.5 F | DIASTOLIC BLOOD PRESSURE: 78 MMHG | BODY MASS INDEX: 28.29 KG/M2 | SYSTOLIC BLOOD PRESSURE: 114 MMHG | HEIGHT: 65 IN

## 2020-11-13 DIAGNOSIS — Z12.31 ENCOUNTER FOR SCREENING MAMMOGRAM FOR MALIGNANT NEOPLASM OF BREAST: ICD-10-CM

## 2020-11-13 DIAGNOSIS — Z01.419 ENCOUNTER FOR GYNECOLOGICAL EXAMINATION WITHOUT ABNORMAL FINDING: Primary | ICD-10-CM

## 2020-11-13 PROCEDURE — S0612 ANNUAL GYNECOLOGICAL EXAMINA: HCPCS | Performed by: PHYSICIAN ASSISTANT

## 2021-03-17 ENCOUNTER — VBI (OUTPATIENT)
Dept: ADMINISTRATIVE | Facility: OTHER | Age: 46
End: 2021-03-17

## 2021-04-07 ENCOUNTER — OFFICE VISIT (OUTPATIENT)
Dept: OBGYN CLINIC | Facility: CLINIC | Age: 46
End: 2021-04-07
Payer: COMMERCIAL

## 2021-04-07 VITALS
DIASTOLIC BLOOD PRESSURE: 76 MMHG | SYSTOLIC BLOOD PRESSURE: 112 MMHG | WEIGHT: 178 LBS | BODY MASS INDEX: 29.66 KG/M2 | HEIGHT: 65 IN

## 2021-04-07 DIAGNOSIS — N95.0 POSTMENOPAUSAL BLEEDING: Primary | ICD-10-CM

## 2021-04-07 DIAGNOSIS — Z11.51 SCREENING FOR HPV (HUMAN PAPILLOMAVIRUS): ICD-10-CM

## 2021-04-07 DIAGNOSIS — N95.0 PMB (POSTMENOPAUSAL BLEEDING): ICD-10-CM

## 2021-04-07 DIAGNOSIS — Z01.419 ENCOUNTER FOR GYNECOLOGICAL EXAMINATION WITHOUT ABNORMAL FINDING: ICD-10-CM

## 2021-04-07 PROCEDURE — 99213 OFFICE O/P EST LOW 20 MIN: CPT | Performed by: NURSE PRACTITIONER

## 2021-04-07 PROCEDURE — G0145 SCR C/V CYTO,THINLAYER,RESCR: HCPCS | Performed by: NURSE PRACTITIONER

## 2021-04-07 PROCEDURE — 1036F TOBACCO NON-USER: CPT | Performed by: NURSE PRACTITIONER

## 2021-04-07 PROCEDURE — 3008F BODY MASS INDEX DOCD: CPT | Performed by: NURSE PRACTITIONER

## 2021-04-07 PROCEDURE — 87624 HPV HI-RISK TYP POOLED RSLT: CPT | Performed by: NURSE PRACTITIONER

## 2021-04-07 NOTE — ASSESSMENT & PLAN NOTE
LMP 11/19  Pelvic U/S ordered  Pap collected  Declines STI testing  Discussed causes of PMB including polyps, abnormal pap, endometrial hyperplasia and infection   Plan pending U/S

## 2021-04-07 NOTE — PROGRESS NOTES
Assessment/Plan:    PMB (postmenopausal bleeding)  LMP 11/19  Pelvic U/S ordered  Pap collected  Declines STI testing  Discussed causes of PMB including polyps, abnormal pap, endometrial hyperplasia and infection  Plan pending U/S  Diagnoses and all orders for this visit:    Postmenopausal bleeding  -     Liquid-based pap, screening  -     US pelvis complete w transvaginal; Future    Screening for HPV (human papillomavirus)  -     Liquid-based pap, screening    Encounter for gynecological examination without abnormal finding  -     Liquid-based pap, screening    PMB (postmenopausal bleeding)        Subjective:      Patient ID: Saud Fuentes is a 54 y o  female  Ana Hodge is a 54year old who presents with complaints of post menopausal spotting  Her LMP was 11/19  She noticed spotting when wiping this past weekend with none on Monday or Tuesday but does have light spotting today  She denies pain or cramping  She denies abdominal/pelvic pain, UTI symptoms or unusual vaginal discharge, itching or odor  She is sexually active with one male partner and is not interested in STI testing  Last pap 11/17 neg/neg  Has hx of abnormal when in her 25s      The following portions of the patient's history were reviewed and updated as appropriate: allergies, current medications, past family history, past medical history, past social history, past surgical history and problem list       Review of Systems   Constitutional: Negative for chills and fatigue  HENT: Negative for congestion and sore throat  Respiratory: Negative for cough and shortness of breath  Gastrointestinal: Negative for abdominal pain  Genitourinary: Positive for menstrual problem and vaginal bleeding  Negative for decreased urine volume, difficulty urinating, dyspareunia, dysuria, frequency and vaginal discharge  Skin: Negative  Neurological: Negative  Hematological: Negative  Psychiatric/Behavioral: Negative  Objective:      /76 (BP Location: Right arm, Patient Position: Sitting, Cuff Size: Standard)   Ht 5' 5" (1 651 m)   Wt 80 7 kg (178 lb)   LMP 10/11/2019 (Exact Date)   BMI 29 62 kg/m²          Physical Exam  Constitutional:       Appearance: Normal appearance  Pulmonary:      Effort: Pulmonary effort is normal    Abdominal:      Tenderness: There is no abdominal tenderness  Genitourinary:     Labia:         Right: No tenderness, lesion or injury  Left: No tenderness, lesion or injury  Vagina: No signs of injury and foreign body  No vaginal discharge, erythema, tenderness or bleeding  Cervix: No cervical motion tenderness, discharge, friability, lesion or erythema  Adnexa:         Right: No mass, tenderness or fullness  Left: No mass, tenderness or fullness  Skin:     General: Skin is warm and dry  Capillary Refill: Capillary refill takes less than 2 seconds  Neurological:      Mental Status: She is alert and oriented to person, place, and time     Psychiatric:         Mood and Affect: Mood normal

## 2021-04-10 LAB
HPV HR 12 DNA CVX QL NAA+PROBE: NEGATIVE
HPV16 DNA CVX QL NAA+PROBE: NEGATIVE
HPV18 DNA CVX QL NAA+PROBE: NEGATIVE

## 2021-04-12 ENCOUNTER — HOSPITAL ENCOUNTER (OUTPATIENT)
Dept: RADIOLOGY | Facility: HOSPITAL | Age: 46
Discharge: HOME | End: 2021-04-12
Payer: COMMERCIAL

## 2021-04-12 DIAGNOSIS — N95.0 POSTMENOPAUSAL BLEEDING: ICD-10-CM

## 2021-04-12 PROCEDURE — 76830 TRANSVAGINAL US NON-OB: CPT

## 2021-04-12 PROCEDURE — 76856 US EXAM PELVIC COMPLETE: CPT

## 2021-04-14 LAB
LAB AP GYN PRIMARY INTERPRETATION: NORMAL
Lab: NORMAL

## 2021-04-15 ENCOUNTER — TELEPHONE (OUTPATIENT)
Dept: OBGYN CLINIC | Facility: CLINIC | Age: 46
End: 2021-04-15

## 2021-04-15 NOTE — TELEPHONE ENCOUNTER
Spoke with Yesenia  Pap and HPV results are negative  She inquired about the U/S results  I let her know those results are still pending and once results ready and reviewed by provider, the provider will reach out to her

## 2021-04-20 ENCOUNTER — TELEPHONE (OUTPATIENT)
Dept: OBGYN CLINIC | Facility: MEDICAL CENTER | Age: 46
End: 2021-04-20

## 2021-04-20 NOTE — TELEPHONE ENCOUNTER
Please let her know that her U/S shows a thin lining  As this was her first time with PMB, she can monitor  If happens again she needs EMB            ----- Message from Antwon Crane MD sent at 4/20/2021  1:04 PM EDT -----  If bleeding is recurrent would biopsy but if just single episode OK to moniotr  ----- Message -----  From: ESTEVAN Kraft  Sent: 4/20/2021  12:13 PM EDT  To: Antwon Crane MD    LMP 2019 with recent PMB  EL 3 mm     Just making sure the mildly heterogenous EL does not require additional testing  Pamila Code  ----- Message -----  From: Interface, Radiology Results In  Sent: 4/17/2021   3:08 PM EDT  To: ESTEVAN Kraft

## 2021-04-20 NOTE — TELEPHONE ENCOUNTER
Spoke to pt and reviewed msg from [id number] re: PMB  Pt advised to call us if any issues again and monitor for now

## 2021-08-10 ENCOUNTER — HOSPITAL ENCOUNTER (OUTPATIENT)
Dept: RADIOLOGY | Age: 46
Discharge: HOME | End: 2021-08-10
Payer: COMMERCIAL

## 2021-08-10 VITALS — WEIGHT: 178 LBS | HEIGHT: 65 IN | BODY MASS INDEX: 29.66 KG/M2

## 2021-08-10 DIAGNOSIS — Z12.31 ENCOUNTER FOR SCREENING MAMMOGRAM FOR MALIGNANT NEOPLASM OF BREAST: ICD-10-CM

## 2021-08-10 PROCEDURE — 77067 SCR MAMMO BI INCL CAD: CPT

## 2021-08-10 PROCEDURE — 77063 BREAST TOMOSYNTHESIS BI: CPT

## 2021-08-31 ENCOUNTER — HOSPITAL ENCOUNTER (OUTPATIENT)
Dept: ULTRASOUND IMAGING | Facility: CLINIC | Age: 46
Discharge: HOME | End: 2021-08-31
Payer: COMMERCIAL

## 2021-08-31 ENCOUNTER — HOSPITAL ENCOUNTER (OUTPATIENT)
Dept: MAMMOGRAPHY | Facility: CLINIC | Age: 46
Discharge: HOME | End: 2021-08-31
Payer: COMMERCIAL

## 2021-08-31 VITALS — BODY MASS INDEX: 29.66 KG/M2 | HEIGHT: 65 IN | WEIGHT: 178 LBS

## 2021-08-31 DIAGNOSIS — R92.8 ABNORMAL SCREENING MAMMOGRAM: ICD-10-CM

## 2021-08-31 PROCEDURE — 77065 DX MAMMO INCL CAD UNI: CPT

## 2021-08-31 PROCEDURE — G0279 TOMOSYNTHESIS, MAMMO: HCPCS

## 2021-08-31 PROCEDURE — 76642 ULTRASOUND BREAST LIMITED: CPT

## 2021-11-23 ENCOUNTER — ANNUAL EXAM (OUTPATIENT)
Dept: OBGYN CLINIC | Facility: CLINIC | Age: 46
End: 2021-11-23
Payer: COMMERCIAL

## 2021-11-23 VITALS
BODY MASS INDEX: 30.79 KG/M2 | SYSTOLIC BLOOD PRESSURE: 122 MMHG | WEIGHT: 184.8 LBS | HEIGHT: 65 IN | DIASTOLIC BLOOD PRESSURE: 78 MMHG

## 2021-11-23 DIAGNOSIS — Z01.419 ENCNTR FOR GYN EXAM (GENERAL) (ROUTINE) W/O ABN FINDINGS: ICD-10-CM

## 2021-11-23 DIAGNOSIS — N95.0 POSTMENOPAUSAL BLEEDING: ICD-10-CM

## 2021-11-23 DIAGNOSIS — R92.8 ABNORMAL MAMMOGRAM: ICD-10-CM

## 2021-11-23 DIAGNOSIS — Z12.31 ENCOUNTER FOR SCREENING MAMMOGRAM FOR MALIGNANT NEOPLASM OF BREAST: ICD-10-CM

## 2021-11-23 DIAGNOSIS — Z12.11 COLON CANCER SCREENING: ICD-10-CM

## 2021-11-23 PROCEDURE — 58100 BIOPSY OF UTERUS LINING: CPT | Performed by: PHYSICIAN ASSISTANT

## 2021-11-23 PROCEDURE — S0612 ANNUAL GYNECOLOGICAL EXAMINA: HCPCS | Performed by: PHYSICIAN ASSISTANT

## 2021-11-23 PROCEDURE — 88305 TISSUE EXAM BY PATHOLOGIST: CPT | Performed by: PATHOLOGY

## 2021-12-01 ENCOUNTER — TELEPHONE (OUTPATIENT)
Dept: OBGYN CLINIC | Facility: CLINIC | Age: 46
End: 2021-12-01

## 2022-01-26 ENCOUNTER — TELEPHONE (OUTPATIENT)
Dept: GASTROENTEROLOGY | Facility: CLINIC | Age: 47
End: 2022-01-26

## 2022-01-26 NOTE — TELEPHONE ENCOUNTER
Scheduled date of colonoscopy (as of today):04 13 22  Physician performing colonoscopy:DR PACHECO  Location of colonoscopy:[facility]  Bowel prep reviewed with patient:DULCOLAX/MIRALAX  Instructions reviewed with patient by:TIFFANI VERBALLY/MAILED  Clearances: N/A      01/26/22  Screened by: Mailna Alvarenga    Referring Provider DR Erick Cooks    Pre- Screening: Body mass index is 30 42 kg/m²  Has patient been referred for a routine screening Colonoscopy? yes  Is the patient between 39-70 years old? yes      Previous Colonoscopy no   If yes:    Date:     Facility:     Reason:       SCHEDULING STAFF: If the patient is between 45yrs-49yrs, please advise patient to confirm benefits/coverage with their insurance company for a routine screening colonoscopy, some insurance carriers will only cover at [address] or older  If the patient is over 66years old, please schedule an office visit  Does the patient want to see a Gastroenterologist prior to their procedure OR are they having any GI symptoms? no    Has the patient been hospitalized or had abdominal surgery in the past 6 months? no    Does the patient use supplemental oxygen? no    Does the patient take Coumadin, Lovenox, Plavix, Elliquis, Xarelto, or other blood thinning medication? no    Has the patient had a stroke, cardiac event, or stent placed in the past year? no    SCHEDULING STAFF: If patient answers NO to above questions, then schedule procedure  If patient answers YES to above questions, then schedule office appointment  PT PASSED OA AND CAN BE REACHED [phone]    If patient is between 45yrs - 49yrs, please advise patient that we will have to confirm benefits & coverage with their insurance company for a routine screening colonoscopy

## 2022-02-16 ENCOUNTER — HOSPITAL ENCOUNTER (OUTPATIENT)
Dept: ULTRASOUND IMAGING | Facility: CLINIC | Age: 47
Discharge: HOME | End: 2022-02-16
Payer: COMMERCIAL

## 2022-02-16 VITALS — WEIGHT: 184 LBS | BODY MASS INDEX: 30.66 KG/M2 | HEIGHT: 65 IN

## 2022-02-16 DIAGNOSIS — R92.8 ABNORMAL MAMMOGRAM: ICD-10-CM

## 2022-02-16 PROCEDURE — 76642 ULTRASOUND BREAST LIMITED: CPT

## 2022-04-13 ENCOUNTER — ANESTHESIA (OUTPATIENT)
Dept: GASTROENTEROLOGY | Facility: HOSPITAL | Age: 47
End: 2022-04-13

## 2022-04-13 ENCOUNTER — ANESTHESIA EVENT (OUTPATIENT)
Dept: GASTROENTEROLOGY | Facility: HOSPITAL | Age: 47
End: 2022-04-13

## 2022-04-13 ENCOUNTER — HOSPITAL ENCOUNTER (OUTPATIENT)
Dept: GASTROENTEROLOGY | Facility: HOSPITAL | Age: 47
Setting detail: OUTPATIENT SURGERY
Discharge: HOME | End: 2022-04-13
Attending: INTERNAL MEDICINE
Payer: COMMERCIAL

## 2022-04-13 VITALS
DIASTOLIC BLOOD PRESSURE: 72 MMHG | HEART RATE: 80 BPM | SYSTOLIC BLOOD PRESSURE: 122 MMHG | OXYGEN SATURATION: 97 % | TEMPERATURE: 97.8 F | RESPIRATION RATE: 18 BRPM

## 2022-04-13 DIAGNOSIS — Z12.11 COLON CANCER SCREENING: ICD-10-CM

## 2022-04-13 PROCEDURE — 88305 TISSUE EXAM BY PATHOLOGIST: CPT | Performed by: PATHOLOGY

## 2022-04-13 PROCEDURE — 45385 COLONOSCOPY W/LESION REMOVAL: CPT | Performed by: INTERNAL MEDICINE

## 2022-04-13 RX ORDER — LIDOCAINE HYDROCHLORIDE 10 MG/ML
INJECTION, SOLUTION EPIDURAL; INFILTRATION; INTRACAUDAL; PERINEURAL AS NEEDED
Status: DISCONTINUED | OUTPATIENT
Start: 2022-04-13 | End: 2022-04-13

## 2022-04-13 RX ORDER — SODIUM CHLORIDE 9 MG/ML
INJECTION, SOLUTION INTRAVENOUS CONTINUOUS PRN
Status: DISCONTINUED | OUTPATIENT
Start: 2022-04-13 | End: 2022-04-13

## 2022-04-13 RX ORDER — PROPOFOL 10 MG/ML
INJECTION, EMULSION INTRAVENOUS AS NEEDED
Status: DISCONTINUED | OUTPATIENT
Start: 2022-04-13 | End: 2022-04-13

## 2022-04-13 RX ORDER — SODIUM CHLORIDE 9 MG/ML
50 INJECTION, SOLUTION INTRAVENOUS CONTINUOUS
Status: DISCONTINUED | OUTPATIENT
Start: 2022-04-13 | End: 2022-04-17 | Stop reason: HOSPADM

## 2022-04-13 RX ADMIN — PROPOFOL 20 MG: 10 INJECTION, EMULSION INTRAVENOUS at 09:23

## 2022-04-13 RX ADMIN — LIDOCAINE HYDROCHLORIDE 100 MG: 10 INJECTION, SOLUTION EPIDURAL; INFILTRATION; INTRACAUDAL; PERINEURAL at 09:20

## 2022-04-13 RX ADMIN — PROPOFOL 50 MG: 10 INJECTION, EMULSION INTRAVENOUS at 09:35

## 2022-04-13 RX ADMIN — PROPOFOL 50 MG: 10 INJECTION, EMULSION INTRAVENOUS at 09:29

## 2022-04-13 RX ADMIN — PROPOFOL 50 MG: 10 INJECTION, EMULSION INTRAVENOUS at 09:26

## 2022-04-13 RX ADMIN — SODIUM CHLORIDE: 0.9 INJECTION, SOLUTION INTRAVENOUS at 09:14

## 2022-04-13 RX ADMIN — PROPOFOL 180 MG: 10 INJECTION, EMULSION INTRAVENOUS at 09:20

## 2022-04-13 NOTE — H&P
History and Physical - [facility]  Boni Cockayne 51 y o  female MRN: [id number]                  HPI: Boni Cockayne is a 51y o  year old female who presents for colonoscopy for colon cancer screening  REVIEW OF SYSTEMS: Per the HPI, and otherwise unremarkable      Historical Information   Past Medical History:   Diagnosis Date    Allergy     Anxiety     Anxiety     Depression     Dermatitis     Dysplastic nevus     Insomnia     Menometrorrhagia     Vertigo      Past Surgical History:   Procedure Laterality Date    BREAST EXCISIONAL BIOPSY Left     COLPOSCOPY VULVA W/ BIOPSY      ENDOMETRIAL BIOPSY      SKIN BIOPSY      each additional lesions     Social History   Social History     Substance and Sexual Activity   Alcohol Use Yes    Comment: social     Social History     Substance and Sexual Activity   Drug Use No     Social History     Tobacco Use   Smoking Status Current Some Day Smoker    Last attempt to quit: 1999    Years since quittin 3   Smokeless Tobacco Former User   Tobacco Comment    Quit     Family History   Problem Relation Age of Onset    Subarachnoid hemorrhage Mother     Breast cancer Maternal Grandmother 67    Colon cancer Maternal Grandfather 72    Lung cancer Maternal Grandfather 79    Heart attack Paternal Grandfather     Prostate cancer Father 79    Lung cancer Paternal Grandmother     Alzheimer's disease Paternal Grandmother     Crohn's disease Son     Ulcerative colitis Son     Breast cancer Paternal Aunt 61    Breast cancer Other 45    No Known Problems Sister     No Known Problems Daughter     No Known Problems Brother     No Known Problems Paternal Aunt     No Known Problems Paternal Aunt        Meds/Allergies       Current Outpatient Medications:     clonazePAM (KlonoPIN) 0 5 mg tablet    multivitamin (THERAGRAN) TABS    PARoxetine (PAXIL-CR) 25 mg 24 hr tablet    risperiDONE (RisperDAL) 0 5 mg tablet    No Known Allergies    Objective     /70   Pulse 79   Temp 98 2 °F (36 8 °C) (Tympanic)   Resp 20   LMP 10/11/2019 (Exact Date)   SpO2 99%       PHYSICAL EXAM    Gen: NAD  Head: NCAT  CV: RRR  CHEST: Clear  ABD: soft, NT/ND  EXT: no edema      ASSESSMENT/PLAN:   Reyna Gutierrez is a 51y o  year old female who presents for colonoscopy for colon cancer screening  The patient is stable and optimized for the procedure, we reviewed risk and benefits  Risk include but not limited to infection, bleeding, perforation and missing a lesion

## 2022-04-13 NOTE — ANESTHESIA POSTPROCEDURE EVALUATION
Post-Op Assessment Note    CV Status:  Stable  Pain Score: 0    Pain management: adequate     Mental Status:  Sleepy   Hydration Status:  Stable   PONV Controlled:  None      Post Op Vitals Reviewed: Yes      Staff: Anesthesiologist         No complications documented      /57 (04/13/22 0945)    Temp 97 8 °F (36 6 °C) (04/13/22 0950)    Pulse 70 (04/13/22 0950)   Resp (!) 10 (04/13/22 0950)    SpO2 100 % (04/13/22 0950)

## 2022-04-13 NOTE — ANESTHESIA PREPROCEDURE EVALUATION
Procedure:  COLONOSCOPY    Relevant Problems   ENDO   (+) Hypothyroidism      NEURO/PSYCH   (+) Anxiety disorder   (+) Depression with anxiety        Physical Exam    Airway    Mallampati score: I  TM Distance: >3 FB  Neck ROM: full     Dental   No notable dental hx     Cardiovascular  Cardiovascular exam normal    Pulmonary  Pulmonary exam normal     Other Findings        Anesthesia Plan  ASA Score- 2     Anesthesia Type- IV sedation with anesthesia with ASA Monitors  Additional Monitors:   Airway Plan:           Plan Factors-Exercise tolerance (METS): >4 METS  Chart reviewed  Existing labs reviewed  Patient summary reviewed  Patient is a current smoker  Patient instructed to abstain from smoking on day of procedure  Patient did not smoke on day of surgery  Induction- intravenous  Postoperative Plan-     Informed Consent- Anesthetic plan and risks discussed with patient and spouse  I personally reviewed this patient with the CRNA  Discussed and agreed on the Anesthesia Plan with the CRNA  Jaycee Martin is a 51 y o  female presenting today for Colonoscopy screening  Cp0qnmcj of anesthesia: no issues  NPO since approrpriate     Denies N/V, F, chills, CP, SOB  AQA  Plan for MAC

## 2022-08-17 ENCOUNTER — HOSPITAL ENCOUNTER (OUTPATIENT)
Dept: MAMMOGRAPHY | Facility: CLINIC | Age: 47
Discharge: HOME | End: 2022-08-17
Payer: COMMERCIAL

## 2022-08-17 ENCOUNTER — HOSPITAL ENCOUNTER (OUTPATIENT)
Dept: ULTRASOUND IMAGING | Facility: CLINIC | Age: 47
Discharge: HOME | End: 2022-08-17
Payer: COMMERCIAL

## 2022-08-17 VITALS — WEIGHT: 184 LBS | HEIGHT: 64 IN | BODY MASS INDEX: 31.41 KG/M2

## 2022-08-17 DIAGNOSIS — R92.8 ABNORMAL MAMMOGRAM: ICD-10-CM

## 2022-08-17 PROCEDURE — 76642 ULTRASOUND BREAST LIMITED: CPT

## 2022-08-17 PROCEDURE — G0279 TOMOSYNTHESIS, MAMMO: HCPCS

## 2022-08-17 PROCEDURE — 77066 DX MAMMO INCL CAD BI: CPT

## 2022-09-14 ENCOUNTER — TELEPHONE (OUTPATIENT)
Dept: OBGYN CLINIC | Facility: CLINIC | Age: 47
End: 2022-09-14

## 2022-09-14 DIAGNOSIS — Z91.89 AT HIGH RISK FOR BREAST CANCER: Primary | ICD-10-CM

## 2022-09-14 NOTE — TELEPHONE ENCOUNTER
Zahraa Burns was prov,  Pt inquiring/has questions & concerns about needing an MRI of Left Breast,  pts  screening Mammo was Abnormal/pt at high risk,  pls CALL PT to advise,  Thanks

## 2022-09-14 NOTE — TELEPHONE ENCOUNTER
Left a voice message to review the [employer] message and call with any questions  Diagnostic mammogram results are stable  Left breast mass is unchanged since 8/2021 and is probably benign  Normal right breast exam    Continue with monthly breast self exam and annual diagnostic mammogram  Ultrasound of left breast recommended for one year  (Scheduled for 8/21/23 )   Lifetime breast cancer risk score (edwin) is elevated above the threshold of 20%, which is related to your family history of breast cancer  Supplemental screening of a breast MRI is recommended  This should occur 6 months from the time this mammogram was completed  The order in in your chart  Please call central scheduling at [phone] to choose a time convenient for you  Please call the office [phone] one month prior to the planned MRI for the test to be pre authorized

## 2022-11-02 ENCOUNTER — OFFICE VISIT (OUTPATIENT)
Dept: FAMILY MEDICINE CLINIC | Facility: CLINIC | Age: 47
End: 2022-11-02

## 2022-11-02 VITALS
SYSTOLIC BLOOD PRESSURE: 120 MMHG | HEART RATE: 79 BPM | TEMPERATURE: 98.7 F | HEIGHT: 64 IN | WEIGHT: 187.8 LBS | OXYGEN SATURATION: 96 % | RESPIRATION RATE: 16 BRPM | BODY MASS INDEX: 32.06 KG/M2 | DIASTOLIC BLOOD PRESSURE: 86 MMHG

## 2022-11-02 DIAGNOSIS — H92.01 RIGHT EAR PAIN: Primary | ICD-10-CM

## 2022-11-02 DIAGNOSIS — T70.0XXA AIRPLANE EARS, INITIAL ENCOUNTER: ICD-10-CM

## 2022-11-02 NOTE — PROGRESS NOTES
Name: Magnus Raines      :       MRN: [id number]  Encounter Provider: Loree Salmeron MD  Encounter Date: 2022   Encounter department: [facility] [address]    Chief Complaint   Patient presents with   • Earache     Right ear and throat pain      Health Maintenance   Topic Date Due   • Hepatitis C Screening  Never done   • COVID-19 Vaccine (1) Never done   • Pneumococcal Vaccine: Pediatrics (0 to 5 Years) and At-Risk Patients (6 to 59 Years) (1 - PCV) Never done   • HIV Screening  Never done   • Annual Physical  Never done   • DTaP,Tdap,and Td Vaccines (1 - Tdap) Never done   • BMI: Followup Plan  2020   • Influenza Vaccine (1) Never done   • BMI: Adult  2023   • Breast Cancer Screening: Mammogram  2023   • Cervical Cancer Screening  2026   • Colorectal Cancer Screening  2029   • HIB Vaccine  Aged Out   • Hepatitis B Vaccine  Aged Out   • IPV Vaccine  Aged Out   • Hepatitis A Vaccine  Aged Out   • Meningococcal ACWY Vaccine  Aged Out   • HPV Vaccine  Aged Out       Assessment & Plan     1  Right ear pain    2  Airplane ears, initial encounter      No infection signs  No need antibiotics  May use OTC Afrin but not more than 3 days  Use OTC zyrtec daily  Call office if symptoms no improving  Subjective      HPI     Pt is here by herself  She flies to [place] last week  Went there on Thursday and felt right ear uncomfortable  She came back on  and felt right ear pain got worse  Right ear pain  She can hear but muffled  Postnasal drip  Little cough  Denies fever  Denies wheezing, SOB,CP, n/v/diarrhea  Tried OTC tylenol but no help  FU psychiatrist and mood is stable  Review of Systems   Constitutional: Negative for appetite change, chills and fever  HENT: Positive for ear pain and postnasal drip  Negative for congestion, sinus pain and sore throat  Eyes: Negative for discharge and itching     Respiratory: Negative for apnea, cough, chest tightness, shortness of breath and wheezing  Cardiovascular: Negative for chest pain, palpitations and leg swelling  Gastrointestinal: Negative for abdominal pain, anal bleeding, constipation, diarrhea, nausea and vomiting  Endocrine: Negative for cold intolerance, heat intolerance and polyuria  Genitourinary: Negative for difficulty urinating and dysuria  Musculoskeletal: Negative for arthralgias, back pain and myalgias  Skin: Negative for rash  Neurological: Negative for dizziness and headaches  Psychiatric/Behavioral: Negative for agitation  Current Outpatient Medications on File Prior to Visit   Medication Sig   • clonazePAM (KlonoPIN) 0 5 mg tablet Take by mouth   • multivitamin (THERAGRAN) TABS Take 1 tablet by mouth daily   • PARoxetine (PAXIL-CR) 25 mg 24 hr tablet TK 1 T PO HS   • risperiDONE (RisperDAL) 0 5 mg tablet Take 1 tablet by mouth       Objective     /86 (BP Location: Left arm, Patient Position: Sitting)   Pulse 79   Temp 98 7 °F (37 1 °C) (Tympanic)   Resp 16   Ht 5' 3 5" (1 613 m)   Wt 85 2 kg (187 lb 12 8 oz)   LMP 10/11/2019 (Exact Date)   SpO2 96%   BMI 32 75 kg/m²     Physical Exam  Constitutional:       General: She is not in acute distress  Appearance: She is well-developed  HENT:      Head: Normocephalic  Right Ear: Tympanic membrane and external ear normal       Left Ear: Tympanic membrane and external ear normal       Nose: Congestion present  Comments: Right nose congestion     Mouth/Throat:      Pharynx: No oropharyngeal exudate or posterior oropharyngeal erythema  Eyes:      General:         Right eye: No discharge  Left eye: No discharge  Conjunctiva/sclera: Conjunctivae normal       Pupils: Pupils are equal, round, and reactive to light  Neck:      Thyroid: No thyromegaly  Cardiovascular:      Rate and Rhythm: Normal rate and regular rhythm  Heart sounds: Normal heart sounds   No murmur heard  No friction rub  No gallop  Pulmonary:      Effort: Pulmonary effort is normal  No respiratory distress  Breath sounds: Normal breath sounds  No wheezing or rales  Chest:      Chest wall: No tenderness  Abdominal:      General: Bowel sounds are normal       Palpations: Abdomen is soft  Musculoskeletal:         General: Normal range of motion  Cervical back: Normal range of motion  Lymphadenopathy:      Cervical: No cervical adenopathy  Neurological:      Mental Status: She is alert         Mike Nichole MD

## 2022-11-30 ENCOUNTER — ANNUAL EXAM (OUTPATIENT)
Dept: OBGYN CLINIC | Facility: CLINIC | Age: 47
End: 2022-11-30

## 2022-11-30 VITALS
SYSTOLIC BLOOD PRESSURE: 124 MMHG | WEIGHT: 189.4 LBS | BODY MASS INDEX: 31.56 KG/M2 | HEIGHT: 65 IN | DIASTOLIC BLOOD PRESSURE: 80 MMHG

## 2022-11-30 DIAGNOSIS — Z91.89 AT HIGH RISK FOR BREAST CANCER: ICD-10-CM

## 2022-11-30 DIAGNOSIS — Z01.419 ROUTINE GYNECOLOGICAL EXAMINATION: Primary | ICD-10-CM

## 2022-11-30 NOTE — PROGRESS NOTES
Assessment   49 y o  postmenopausal female presenting for annual exam      Plan   Diagnoses and all orders for this visit:    Routine gynecological examination  Normal findings on routine exam    considerations reviewed  Aware of sx to report  Breast awareness/SBE encouraged  Encouraged 150 min of exercise per week  Reviewed balanced diet  Vitamin D and Calcium supplement recommended  At high risk for breast cancer  Was contacted after last mammogram and recommended supplemental screening  This was discussed today, that based on her lifetime risk and breast density she may benefit from additional screening with breast MRI  Discussed this is not always covered by insurance and I recommend checking coverage first to determine potential out of pocket cost  She is already scheduled to have this done in February  All questions answered  Pap due 2026  Mammo slip given   Colonoscopy due 2029      RTO one year for yearly exam or sooner as needed  __________________________________________________________________      Subjective     Haylee Marc is a 49 y o  postmenopausal female presenting for annual exam  She is without complaint and does not want STD testing today  Son is a senior at [employer]  Daughter is doing well  SCREENING  Last Pap: 04/07/2021 NILM/neg  Last Mammo: 08/17/2022 diagnostic bilateral - Left breast mass BIRADS 3, and right Breast BIRADS 1  Last Colonoscopy: 04/13/2022 7 year recall due to personal hx of colon polyps  Last DEXA: n/a    GYN  Denies postmenopausal vaginal bleeding, dryness, vaginal discharge, itching, odor, pelvic pain and vulvar/vaginal symptoms    Had work up for PMB last year, which was negative  No subsequent occurrences  Menarche 14  Menopause at age 46  Menopausal symptoms rare VM sx  Sexually active: Yes - single partner -   Sexual dysfunction: vaginal dryness managed with use of lubricant      Hx STI: denies     Hx Abnormal pap: denies  We reviewed ASCCP guidelines for Pap testing today  OB  [id number]        Complaints: denies  Denies leakage/difficulty urinating, dysuria, hematuria, and urinary frequency/urgency      BREAST  Complaints: denies  Denies: breast lump, breast tenderness, dryness, nipple discharge, pruritus, skin color change, and skin lesion(s)  Personal hx: Left breast lumpectomy at age 21 - benign  She notes left breast pain in the region of scar  Described as zing or radiating pain, happens a few times per year, lasting seconds, and has been ongoing for years, unchanged  GENERAL  PMH reviewed/updated and is as below  Patient does follow with a PCP    Exercise: none     Pertinent Family Hx:   Family hx of breast cancer: MGM, paternal aunt  Family hx of ovarian cancer: no  Family hx of colon cancer: MGF      Past Medical History:   Diagnosis Date   • Allergy    • Anxiety    • Anxiety    • Depression    • Dermatitis    • Dysplastic nevus    • Insomnia    • Menometrorrhagia    • Vertigo        Past Surgical History:   Procedure Laterality Date   • BREAST EXCISIONAL BIOPSY Left 2003   • COLPOSCOPY VULVA W/ BIOPSY     • ENDOMETRIAL BIOPSY     • SKIN BIOPSY      each additional lesions         Current Outpatient Medications:   •  clonazePAM (KlonoPIN) 0 5 mg tablet, Take by mouth, Disp: , Rfl:   •  multivitamin (THERAGRAN) TABS, Take 1 tablet by mouth daily, Disp: , Rfl:   •  PARoxetine (PAXIL-CR) 25 mg 24 hr tablet, TK 1 T PO HS, Disp: , Rfl: 0  •  risperiDONE (RisperDAL) 0 5 mg tablet, Take 1 tablet by mouth, Disp: , Rfl:     No Known Allergies    Social History     Socioeconomic History   • Marital status: /Civil Union     Spouse name: Not on file   • Number of children: Not on file   • Years of education: Not on file   • Highest education level: Not on file   Occupational History   • Not on file   Tobacco Use   • Smoking status: Some Days     Packs/day: 0 25     Years: 5 00     Pack years: 1 25 Types: Cigarettes     Last attempt to quit: 1999     Years since quittin 9   • Smokeless tobacco: Former   • Tobacco comments:     social   Vaping Use   • Vaping Use: Never used   Substance and Sexual Activity   • Alcohol use: Yes     Alcohol/week: 3 0 standard drinks     Types: 3 Cans of beer per week     Comment: social   • Drug use: No   • Sexual activity: Yes     Partners: Male     Birth control/protection: None, Post-menopausal   Other Topics Concern   • Not on file   Social History Narrative    Caffeine use    Exercises regularly     Social Determinants of Health     Financial Resource Strain: Not on file   Food Insecurity: Not on file   Transportation Needs: Not on file   Physical Activity: Not on file   Stress: Not on file   Social Connections: Not on file   Intimate Partner Violence: Not on file   Housing Stability: Not on file         Review of Systems     ROS:  Constitutional: Negative for appetite change, fatigue and unexpected weight change  Respiratory: Negative  Cardiovascular: Negative  Gastrointestinal: Negative for abdominal pain and change in bowel habits/constipation/diarrhea  Breasts:  Negative other than as noted above  Genitourinary: Negative other than as noted above  Psychiatric: Negative for mood difficulties  Objective      /80 (BP Location: Left arm, Patient Position: Sitting, Cuff Size: Standard)   Ht 5' 5" (1 651 m)   Wt 85 9 kg (189 lb 6 4 oz)   LMP 10/11/2019 (Exact Date)   BMI 31 52 kg/m²     Physical Examination:  Patient appears well and is not in distress  Neck is supple without masses  Breasts are symmetrical without mass, tenderness, nipple discharge, skin changes or adenopathy  Abdomen is soft and nontender without masses  External genitals are normal without lesions or rashes  Urethral meatus and urethra are normal  Bladder is normal to palpation  Vagina is normal without discharge or bleeding     Cervix is normal without discharge or lesion  Uterus is normal, mobile, nontender without palpable mass  Adnexa are normal, nontender, without palpable mass

## 2022-12-09 ENCOUNTER — OFFICE VISIT (OUTPATIENT)
Dept: FAMILY MEDICINE CLINIC | Facility: CLINIC | Age: 47
End: 2022-12-09

## 2022-12-09 VITALS
RESPIRATION RATE: 16 BRPM | TEMPERATURE: 99.8 F | HEART RATE: 98 BPM | SYSTOLIC BLOOD PRESSURE: 135 MMHG | BODY MASS INDEX: 31.31 KG/M2 | OXYGEN SATURATION: 100 % | HEIGHT: 65 IN | DIASTOLIC BLOOD PRESSURE: 79 MMHG | WEIGHT: 187.9 LBS

## 2022-12-09 DIAGNOSIS — Z00.00 ANNUAL PHYSICAL EXAM: Primary | ICD-10-CM

## 2022-12-09 DIAGNOSIS — Z11.4 SCREENING FOR HIV (HUMAN IMMUNODEFICIENCY VIRUS): ICD-10-CM

## 2022-12-09 DIAGNOSIS — Z23 ENCOUNTER FOR IMMUNIZATION: ICD-10-CM

## 2022-12-09 DIAGNOSIS — T70.0XXD OTITIC BAROTRAUMA, SUBSEQUENT ENCOUNTER: ICD-10-CM

## 2022-12-09 DIAGNOSIS — Z11.59 NEED FOR HEPATITIS C SCREENING TEST: ICD-10-CM

## 2022-12-09 DIAGNOSIS — F41.8 DEPRESSION WITH ANXIETY: ICD-10-CM

## 2022-12-09 DIAGNOSIS — Z13.220 SCREENING FOR HYPERLIPIDEMIA: ICD-10-CM

## 2022-12-09 PROBLEM — R68.89 FLU-LIKE SYMPTOMS: Status: RESOLVED | Noted: 2019-12-20 | Resolved: 2022-12-09

## 2022-12-09 NOTE — PROGRESS NOTES
[facility]    NAME: Elva Viramontes  AGE: 49 y o   SEX: female  : 1975     DATE: 2022   Chief Complaint   Patient presents with   • Physical Exam     Annual check up      Health Maintenance   Topic Date Due   • Hepatitis C Screening  Never done   • Hepatitis B Vaccine (1 of 3 - 3-dose series) Never done   • COVID-19 Vaccine (1) Never done   • Pneumococcal Vaccine: Pediatrics (0 to 5 Years) and At-Risk Patients (6 to 59 Years) (1 - PCV) Never done   • HIV Screening  Never done   • Annual Physical  Never done   • DTaP,Tdap,and Td Vaccines (1 - Tdap) Never done   • BMI: Followup Plan  2020   • Influenza Vaccine (1) Never done   • Breast Cancer Screening: Mammogram  2023   • BMI: Adult  2023   • Cervical Cancer Screening  2026   • Colorectal Cancer Screening  2029   • HIB Vaccine  Aged Out   • IPV Vaccine  Aged Out   • Hepatitis A Vaccine  Aged Out   • Meningococcal ACWY Vaccine  Aged Out   • HPV Vaccine  Aged Out          Assessment and Plan:     Problem List Items Addressed This Visit        Other    Depression with anxiety    Relevant Orders    CBC and differential    Comprehensive metabolic panel    Lipid Panel with Direct LDL reflex    TSH, 3rd generation with Free T4 reflex   Other Visit Diagnoses     Annual physical exam    -  Primary    Relevant Orders    CBC and differential    Comprehensive metabolic panel    Lipid Panel with Direct LDL reflex    TSH, 3rd generation with Free T4 reflex    Otitic barotrauma, subsequent encounter        Relevant Orders    Ambulatory Referral to Otolaryngology    Need for hepatitis C screening test        Relevant Orders    Hepatitis C Antibody ([facility])    Encounter for immunization        Screening for HIV (human immunodeficiency virus)        Screening for hyperlipidemia        Relevant Orders    CBC and differential    Comprehensive metabolic panel    Lipid Panel with Direct LDL reflex    TSH, 3rd generation with Free T4 reflex        Refused flu shot  Refused covid19 shots  Refused PCV20  Refused shots per pt  HIV screen done before  Pap 2021 negative per GYN  Mammogram regularly  Colonoscopy 4/2022, repeat in 7 years  RTO in 1 year  Immunizations and preventive care screenings were discussed with patient today  Appropriate education was printed on patient's after visit summary  Counseling:  Alcohol/drug use: discussed moderation in alcohol intake, the recommendations for healthy alcohol use, and avoidance of illicit drug use  Dental Health: discussed importance of regular tooth brushing, flossing, and dental visits  Injury prevention: discussed safety/seat belts, safety helmets, smoke detectors, carbon dioxide detectors, and smoking near bedding or upholstery  Sexual health: discussed sexually transmitted diseases, partner selection, use of condoms, avoidance of unintended pregnancy, and contraceptive alternatives  · Exercise: the importance of regular exercise/physical activity was discussed  Recommend exercise 3-5 times per week for at least 30 minutes  Return in about 1 year (around 12/9/2023) for Annual physical      Chief Complaint:     Chief Complaint   Patient presents with   • Physical Exam     Annual check up       History of Present Illness:     Adult Annual Physical   Patient here for a comprehensive physical exam  The patient reports problems - still feels ears muffled  Better though       Depression/anxiety---FU psychiatrist Dr Kya Orosco Q 3 months  She is on paxil and risperidone  Social smoker, alcohol  No drugs  Diet and Physical Activity  · Diet/Nutrition: well balanced diet  · Exercise: no formal exercise  Depression Screening  PHQ-2/9 Depression Screening         General Health  · Sleep: sleeps well  · Hearing: b/l muffles  · Vision: wears glasses     · Dental: regular dental visits  /GYN Health  · Patient is: menopause  · FU OBGYN  Review of Systems:     Review of Systems   Constitutional: Negative for appetite change, chills and fever  HENT: Negative for congestion, ear pain, sinus pain and sore throat  Eyes: Negative for discharge and itching  Respiratory: Negative for apnea, cough, chest tightness, shortness of breath and wheezing  Cardiovascular: Negative for chest pain, palpitations and leg swelling  Gastrointestinal: Negative for abdominal pain, anal bleeding, constipation, diarrhea, nausea and vomiting  Endocrine: Negative for cold intolerance, heat intolerance and polyuria  Genitourinary: Negative for difficulty urinating and dysuria  Musculoskeletal: Negative for arthralgias, back pain and myalgias  Skin: Negative for rash  Neurological: Negative for dizziness and headaches  Psychiatric/Behavioral: Negative for agitation  Past Medical History:     Past Medical History:   Diagnosis Date   • Allergy    • Anxiety    • Anxiety    • Depression    • Dermatitis    • Dysplastic nevus    • Insomnia    • Menometrorrhagia    • Vertigo       Past Surgical History:     Past Surgical History:   Procedure Laterality Date   • BREAST EXCISIONAL BIOPSY Left    • COLPOSCOPY VULVA W/ BIOPSY     • ENDOMETRIAL BIOPSY     • SKIN BIOPSY      each additional lesions      Social History:     Social History     Socioeconomic History   • Marital status: /Civil Union     Spouse name: None   • Number of children: None   • Years of education: None   • Highest education level: None   Occupational History   • None   Tobacco Use   • Smoking status: Some Days     Packs/day: 0 25     Years: 5 00     Pack years: 1 25     Types: Cigarettes     Last attempt to quit: 1999     Years since quittin 9   • Smokeless tobacco: Former   • Tobacco comments:     social   Vaping Use   • Vaping Use: Never used   Substance and Sexual Activity   • Alcohol use:  Yes Alcohol/week: 3 0 standard drinks     Types: 3 Cans of beer per week     Comment: social   • Drug use: No   • Sexual activity: Yes     Partners: Male     Birth control/protection: None, Post-menopausal   Other Topics Concern   • None   Social History Narrative    Caffeine use    Exercises regularly     Social Determinants of Health     Financial Resource Strain: Not on file   Food Insecurity: Not on file   Transportation Needs: Not on file   Physical Activity: Not on file   Stress: Not on file   Social Connections: Not on file   Intimate Partner Violence: Not on file   Housing Stability: Not on file      Family History:     Family History   Problem Relation Age of Onset   • Subarachnoid hemorrhage Mother    • Prostate cancer Father 79   • No Known Problems Sister    • No Known Problems Daughter    • Breast cancer Maternal Grandmother    • Colon cancer Maternal Grandfather    • Lung cancer Maternal Grandfather 79   • Lung cancer Paternal Grandmother    • Alzheimer's disease Paternal Grandmother    • Heart attack Paternal Grandfather    • No Known Problems Brother    • Crohn's disease Son    • Ulcerative colitis Son    • Breast cancer Paternal Aunt 61   • No Known Problems Paternal Aunt    • No Known Problems Paternal Aunt       Current Medications:     Current Outpatient Medications   Medication Sig Dispense Refill   • clonazePAM (KlonoPIN) 0 5 mg tablet Take by mouth     • multivitamin (THERAGRAN) TABS Take 1 tablet by mouth daily     • PARoxetine (PAXIL-CR) 25 mg 24 hr tablet TK 1 T PO HS  0   • risperiDONE (RisperDAL) 0 5 mg tablet Take 1 tablet by mouth       No current facility-administered medications for this visit        Allergies:     No Known Allergies   Physical Exam:     /79 (BP Location: Left arm, Patient Position: Sitting)   Pulse 98   Temp 99 8 °F (37 7 °C) (Tympanic)   Resp 16   Ht 5' 5" (1 651 m)   Wt 85 2 kg (187 lb 14 4 oz)   LMP 10/11/2019 (Exact Date)   SpO2 100%   BMI 31 27 kg/m² Physical Exam  Vitals reviewed  Constitutional:       Appearance: Normal appearance  HENT:      Head: Normocephalic and atraumatic  Right Ear: Tympanic membrane normal       Left Ear: Tympanic membrane normal    Eyes:      General:         Right eye: No discharge  Left eye: No discharge  Conjunctiva/sclera: Conjunctivae normal    Neck:      Vascular: No carotid bruit  Cardiovascular:      Rate and Rhythm: Normal rate and regular rhythm  Heart sounds: Normal heart sounds  No murmur heard  No friction rub  No gallop  Pulmonary:      Effort: Pulmonary effort is normal  No respiratory distress  Breath sounds: Normal breath sounds  No wheezing or rales  Abdominal:      General: Bowel sounds are normal  There is no distension  Palpations: Abdomen is soft  Tenderness: There is no abdominal tenderness  Musculoskeletal:         General: No swelling, tenderness or deformity  Normal range of motion  Cervical back: Normal range of motion and neck supple  No muscular tenderness  Lymphadenopathy:      Cervical: No cervical adenopathy  Neurological:      Mental Status: She is alert     Psychiatric:         Mood and Affect: Mood normal           Deepa Munoz MD  [address]

## 2022-12-09 NOTE — LETTER
December 9, 2022     Patient: Komal Crook  YOB: 1975  Date of Visit: 12/9/2022      To Whom it May Concern:    Jennifer Rees is under my professional care  Nory Boudreaux was seen in my office on 12/9/2022  Nory Boudreaux may return to work on 12/12/2022  If you have any questions or concerns, please don't hesitate to call           Sincerely,          Murray Tripathi MD        CC: No Recipients

## 2023-01-18 ENCOUNTER — TELEPHONE (OUTPATIENT)
Dept: OBGYN CLINIC | Facility: CLINIC | Age: 48
End: 2023-01-18

## 2023-02-07 ENCOUNTER — HOSPITAL ENCOUNTER (OUTPATIENT)
Dept: RADIOLOGY | Facility: HOSPITAL | Age: 48
Discharge: HOME | End: 2023-02-07

## 2023-02-07 VITALS — BODY MASS INDEX: 31.16 KG/M2 | WEIGHT: 187 LBS | HEIGHT: 65 IN

## 2023-02-07 DIAGNOSIS — Z91.89 AT HIGH RISK FOR BREAST CANCER: ICD-10-CM

## 2023-02-07 RX ADMIN — GADOBUTROL 8 ML: 604.72 INJECTION INTRAVENOUS at 16:49

## 2023-08-21 ENCOUNTER — HOSPITAL ENCOUNTER (OUTPATIENT)
Dept: MAMMOGRAPHY | Facility: CLINIC | Age: 48
Discharge: HOME | End: 2023-08-21
Payer: COMMERCIAL

## 2023-08-21 ENCOUNTER — HOSPITAL ENCOUNTER (OUTPATIENT)
Dept: ULTRASOUND IMAGING | Facility: CLINIC | Age: 48
Discharge: HOME | End: 2023-08-21
Payer: COMMERCIAL

## 2023-08-21 VITALS — BODY MASS INDEX: 31.16 KG/M2 | HEIGHT: 65 IN | WEIGHT: 187 LBS

## 2023-08-21 DIAGNOSIS — R92.8 ABNORMAL FINDINGS ON DIAGNOSTIC IMAGING OF BREAST: ICD-10-CM

## 2023-08-21 PROCEDURE — 76642 ULTRASOUND BREAST LIMITED: CPT

## 2023-08-21 PROCEDURE — 77066 DX MAMMO INCL CAD BI: CPT

## 2023-12-04 ENCOUNTER — ANNUAL EXAM (OUTPATIENT)
Dept: OBGYN CLINIC | Facility: CLINIC | Age: 48
End: 2023-12-04

## 2023-12-04 VITALS
HEART RATE: 73 BPM | HEIGHT: 65 IN | WEIGHT: 184.4 LBS | BODY MASS INDEX: 30.72 KG/M2 | DIASTOLIC BLOOD PRESSURE: 76 MMHG | SYSTOLIC BLOOD PRESSURE: 102 MMHG

## 2023-12-04 DIAGNOSIS — Z12.39 ENCOUNTER FOR BREAST CANCER SCREENING OTHER THAN MAMMOGRAM: ICD-10-CM

## 2023-12-04 DIAGNOSIS — Z12.31 ENCOUNTER FOR SCREENING MAMMOGRAM FOR MALIGNANT NEOPLASM OF BREAST: ICD-10-CM

## 2023-12-04 DIAGNOSIS — Z80.3 FAMILY HISTORY OF BREAST CANCER: ICD-10-CM

## 2023-12-04 DIAGNOSIS — R92.30 DENSE BREAST TISSUE: ICD-10-CM

## 2023-12-04 DIAGNOSIS — Z01.419 ROUTINE GYNECOLOGICAL EXAMINATION: Primary | ICD-10-CM

## 2023-12-04 DIAGNOSIS — N95.1 VAGINAL DRYNESS, MENOPAUSAL: ICD-10-CM

## 2023-12-04 DIAGNOSIS — Z91.89 AT HIGH RISK FOR BREAST CANCER: ICD-10-CM

## 2023-12-04 RX ORDER — LURASIDONE HYDROCHLORIDE 20 MG/1
TABLET, FILM COATED ORAL
COMMUNITY
Start: 2023-11-20

## 2023-12-04 NOTE — PROGRESS NOTES
Assessment   51 y.o. postmenopausal female presenting for annual exam.     Plan   Diagnoses and all orders for this visit:    Routine gynecological examination    Normal findings on routine exam.  Encouraged 150 min of exercise per week. Reviewed balanced diet. Multivitamin encouraged   Breast awareness/SBE encouraged     Encounter for screening mammogram for malignant neoplasm of breast  -     Mammo screening bilateral w 3d & cad; Future    Left breast mass stable on diagnostic imaging and breast MRI -- recommended to return to routine screening. Slip given. At high risk for breast cancer  -     MRI breast bilateral w and wo contrast w cad; Future    Discussed that based on her lifetime risk and breast density she may benefit from additional screening with breast MRI - to be completed 6 months from screening mammogram. Discussed this is not always covered by insurance and I recommend checking coverage first to determine potential out of pocket cost. We reviewed risk, benefit, and limitations of imaging. She will consider this and is aware this has been ordered. Encounter for breast cancer screening other than mammogram  -     MRI breast bilateral w and wo contrast w cad; Future    Dense breast tissue  -     MRI breast bilateral w and wo contrast w cad; Future    Family history of breast cancer  -     MRI breast bilateral w and wo contrast w cad; Future    Vaginal dryness, menopausal  Reviewed use of silicone based lubricant or coconut oil during IC. Declines initiation of estrace therapy. Will consider an OTC moisturizer if sx persistent or worsening over next year. Will call if sx not relieved with the above measures. Pap - due 2026  Mammo slip given   Colonoscopy due 2029    RTO one year for yearly exam or sooner as needed.       __________________________________________________________________      Subjective     Easton Toussaint is a 51 y.o. postmenopausal female presenting for annual exam. Kids are doing well - son is 23 - working as a . Daughter will turn 12 early next year. Some weight gain over the past year relating to her sleep medication. Late night snacking /cravings have improved since changing medications. Admits she needs to begin exercising. Depression well controlled. SCREENING  Last Pap: 04/07/2021 NILM/hpv neg   Last Mammo: 08/21/2023 BIRADS 2 - Benign  Last Colonoscopy: 04/13/2022 7 year recall   Last DEXA: n/a     GYN  Had workup for  bleeding 11/2021 - which was neg. No subsequent bleed. Denies postmenopausal vaginal bleeding, vaginal discharge, itching, odor, pelvic pain and vulvar/vaginal symptoms    Sexually active: Yes - single partner -   Concerns: + some dryness - little improvement with water based lubricant. Denies pain, bleeding    Hx Abnormal pap: distant hx of abn - neg on repeat   We reviewed ASCCP guidelines for Pap testing today. OB  [id number]      Complaints: denies  Denies leakage/difficulty urinating, dysuria, hematuria, and urinary frequency/urgency    BREAST  Complaints: episodes of left lateral breast tenderness. Resolved w/o recurrence. Recommended to obtain new - more supportive bras given recent weight gain.   Denies: breast lump, breast tenderness, dryness, nipple discharge, pruritus, skin color change, and skin lesion(s)    Pertinent Family Hx:   Family hx of breast cancer: MGM, paternal aunt  Family hx of ovarian cancer: no  Family hx of colon cancer: MGF      Past Medical History:   Diagnosis Date    Allergy     Anxiety     Anxiety     Depression     Dermatitis     Dysplastic nevus     Insomnia     Menometrorrhagia     Vertigo        Past Surgical History:   Procedure Laterality Date    BREAST EXCISIONAL BIOPSY Left 2003    COLPOSCOPY VULVA W/ BIOPSY      ENDOMETRIAL BIOPSY      SKIN BIOPSY      each additional lesions         Current Outpatient Medications:     clonazePAM (KlonoPIN) 0.5 mg tablet, Take by mouth, Disp: , Rfl:     lurasidone (LATUDA) 20 mg tablet, TAKE 1 TABLET BY MOUTH AT SUPPER TIME, Disp: , Rfl:     multivitamin (THERAGRAN) TABS, Take 1 tablet by mouth daily, Disp: , Rfl:     PARoxetine (PAXIL-CR) 25 mg 24 hr tablet, TK 1 T PO HS, Disp: , Rfl: 0    risperiDONE (RisperDAL) 0.5 mg tablet, Take 1 tablet by mouth (Patient not taking: Reported on 2023), Disp: , Rfl:     No Known Allergies    Social History     Socioeconomic History    Marital status: /Civil Union     Spouse name: Not on file    Number of children: Not on file    Years of education: Not on file    Highest education level: Not on file   Occupational History    Not on file   Tobacco Use    Smoking status: Some Days     Packs/day: 0.25     Years: 5.00     Total pack years: 1.25     Types: Cigarettes     Last attempt to quit: 1999     Years since quittin.9    Smokeless tobacco: Former    Tobacco comments:     social   Vaping Use    Vaping Use: Never used   Substance and Sexual Activity    Alcohol use: Yes     Alcohol/week: 3.0 standard drinks of alcohol     Types: 3 Cans of beer per week     Comment: social    Drug use: No    Sexual activity: Yes     Partners: Male     Birth control/protection: Post-menopausal, None   Other Topics Concern    Not on file   Social History Narrative    Caffeine use    Exercises regularly     Social Determinants of Health     Financial Resource Strain: Not on file   Food Insecurity: Not on file   Transportation Needs: Not on file   Physical Activity: Not on file   Stress: Not on file   Social Connections: Not on file   Intimate Partner Violence: Not on file   Housing Stability: Not on file         Review of Systems   Constitutional: Negative. Respiratory: Negative. Cardiovascular: Negative   Gastrointestinal: Negative   Breasts: As noted above. Genitourinary: As noted above.    Psychiatric: Negative       Objective      /76 (BP Location: Left arm, Patient Position: Sitting, Cuff Size: Standard)   Pulse 73   Ht 5' 5.25" (1.657 m)   Wt 83.6 kg (184 lb 6.4 oz)   LMP 10/10/2019 (Approximate)   BMI 30.45 kg/m²     Physical Examination:  Patient appears well and is not in distress  Breasts are symmetrical without mass, tenderness, nipple discharge, skin changes or adenopathy. Abdomen is soft and nontender without masses. External genitals are normal without lesions or rashes. Urethral meatus and urethra are normal  Bladder is normal to palpation  Vagina is normal without discharge or bleeding. Cervix is normal without discharge or lesion. Uterus is normal, mobile, nontender without palpable mass. Adnexa are normal, nontender, without palpable mass.

## 2023-12-15 ENCOUNTER — OFFICE VISIT (OUTPATIENT)
Dept: FAMILY MEDICINE CLINIC | Facility: CLINIC | Age: 48
End: 2023-12-15
Payer: COMMERCIAL

## 2023-12-15 VITALS
BODY MASS INDEX: 31.19 KG/M2 | WEIGHT: 187.2 LBS | HEART RATE: 88 BPM | OXYGEN SATURATION: 99 % | SYSTOLIC BLOOD PRESSURE: 125 MMHG | HEIGHT: 65 IN | DIASTOLIC BLOOD PRESSURE: 88 MMHG | TEMPERATURE: 99.3 F | RESPIRATION RATE: 18 BRPM

## 2023-12-15 DIAGNOSIS — E66.9 OBESITY (BMI 30-39.9): ICD-10-CM

## 2023-12-15 DIAGNOSIS — F41.8 DEPRESSION WITH ANXIETY: ICD-10-CM

## 2023-12-15 DIAGNOSIS — Z13.220 SCREENING FOR LIPID DISORDERS: ICD-10-CM

## 2023-12-15 DIAGNOSIS — Z00.00 ANNUAL PHYSICAL EXAM: Primary | ICD-10-CM

## 2023-12-15 PROCEDURE — 99396 PREV VISIT EST AGE 40-64: CPT | Performed by: FAMILY MEDICINE

## 2023-12-15 NOTE — PROGRESS NOTES
[id number] [facility]    NAME: Flori Street  AGE: 51 y.o. SEX: female  : 1975     DATE: 12/15/2023     Assessment and Plan:     Problem List Items Addressed This Visit          Other    Depression with anxiety    Relevant Orders    CBC and differential    Comprehensive metabolic panel    Lipid Panel with Direct LDL reflex    TSH, 3rd generation with Free T4 reflex    Obesity (BMI 30-39. 9)    Relevant Orders    CBC and differential    Comprehensive metabolic panel    Lipid Panel with Direct LDL reflex    TSH, 3rd generation with Free T4 reflex     Other Visit Diagnoses       Annual physical exam    -  Primary    Relevant Orders    CBC and differential    Comprehensive metabolic panel    Lipid Panel with Direct LDL reflex    TSH, 3rd generation with Free T4 reflex    Screening for lipid disorders        Relevant Orders    CBC and differential    Comprehensive metabolic panel    Lipid Panel with Direct LDL reflex    TSH, 3rd generation with Free T4 reflex          Refused flu shot. Refused covid19 shots. Refused PCV20. Refused shots per pt. HIV screen done before. Pap  negative per GYN. Mammogram regularly. Colonoscopy 2022, repeat in 7 years. RTO in 1 year. Immunizations and preventive care screenings were discussed with patient today. Appropriate education was printed on patient's after visit summary. Counseling:  Alcohol/drug use: discussed moderation in alcohol intake, the recommendations for healthy alcohol use, and avoidance of illicit drug use. Dental Health: discussed importance of regular tooth brushing, flossing, and dental visits. Injury prevention: discussed safety/seat belts, safety helmets, smoke detectors, carbon dioxide detectors, and smoking near bedding or upholstery.   Sexual health: discussed sexually transmitted diseases, partner selection, use of condoms, avoidance of unintended pregnancy, and contraceptive alternatives. Exercise: the importance of regular exercise/physical activity was discussed. Recommend exercise 3-5 times per week for at least 30 minutes. BMI Counseling: Body mass index is 30.91 kg/m². The BMI is above normal. Nutrition recommendations include decreasing portion sizes, encouraging healthy choices of fruits and vegetables, decreasing fast food intake, consuming healthier snacks, limiting drinks that contain sugar, moderation in carbohydrate intake, increasing intake of lean protein, reducing intake of saturated and trans fat and reducing intake of cholesterol. Exercise recommendations include moderate physical activity 150 minutes/week. No pharmacotherapy was ordered. Rationale for BMI follow-up plan is due to patient being overweight or obese. Tobacco Cessation Counseling: Tobacco cessation counseling was provided. The patient is sincerely urged to quit consumption of tobacco. She is not ready to quit tobacco. Medication options and side effects of medication discussed. Patient refused medication. Return in about 1 year (around 12/15/2024) for Annual physical.     Chief Complaint:     Chief Complaint   Patient presents with    Physical Exam     Annual check up       History of Present Illness:     Adult Annual Physical   Patient here for a comprehensive physical exam. The patient reports no problems. Depression/anxiety--- psychiatrist Dr. Rusty Brown Q 3 months. She is on paxil and latuda which helped. Social smoker, alcohol per pt. No drugs. Diet and Physical Activity  Diet/Nutrition: well balanced diet. Exercise: no formal exercise. Depression Screening  PHQ-2/9 Depression Screening           General Health  Sleep: sleeps well. Hearing: normal - bilateral.  Vision: wears glasses. Dental: regular dental visits. /GYN Health  Follows with gynecology?  yes   Patient is: postmenopausal  Last menstrual period: 2021         Review of Systems:     Review of Systems   Constitutional:  Negative for appetite change, chills and fever. HENT:  Negative for congestion, ear pain, sinus pain and sore throat. Eyes:  Negative for discharge and itching. Respiratory:  Negative for apnea, cough, chest tightness, shortness of breath and wheezing. Cardiovascular:  Negative for chest pain, palpitations and leg swelling. Gastrointestinal:  Negative for abdominal pain, anal bleeding, constipation, diarrhea, nausea and vomiting. Endocrine: Negative for cold intolerance, heat intolerance and polyuria. Genitourinary:  Negative for difficulty urinating and dysuria. Musculoskeletal:  Negative for arthralgias, back pain and myalgias. Skin:  Negative for rash. Neurological:  Negative for dizziness and headaches. Psychiatric/Behavioral:  Negative for agitation.        Past Medical History:     Past Medical History:   Diagnosis Date    Allergy     Anxiety     Anxiety     Depression     Dermatitis     Dysplastic nevus     Insomnia     Menometrorrhagia     Vertigo       Past Surgical History:     Past Surgical History:   Procedure Laterality Date    BREAST EXCISIONAL BIOPSY Left 2003    COLPOSCOPY VULVA W/ BIOPSY      ENDOMETRIAL BIOPSY      SKIN BIOPSY      each additional lesions      Social History:     Social History     Socioeconomic History    Marital status: /Civil Union     Spouse name: None    Number of children: None    Years of education: None    Highest education level: None   Occupational History    None   Tobacco Use    Smoking status: Some Days     Current packs/day: 0.00     Average packs/day: 0.3 packs/day for 5.0 years (1.3 ttl pk-yrs)     Types: Cigarettes     Start date: 1994     Last attempt to quit: 1999     Years since quittin.0     Passive exposure: Never    Smokeless tobacco: Former    Tobacco comments:     social   Vaping Use    Vaping status: Never Used   Substance and Sexual Activity    Alcohol use: Yes     Alcohol/week: 3.0 standard drinks of alcohol     Types: 3 Cans of beer per week     Comment: social    Drug use: No    Sexual activity: Yes     Partners: Male     Birth control/protection: Post-menopausal, None   Other Topics Concern    None   Social History Narrative    Caffeine use    Exercises regularly     Social Determinants of Health     Financial Resource Strain: Not on file   Food Insecurity: Not on file   Transportation Needs: Not on file   Physical Activity: Not on file   Stress: Not on file   Social Connections: Not on file   Intimate Partner Violence: Not on file   Housing Stability: Not on file      Family History:     Family History   Problem Relation Age of Onset    Subarachnoid hemorrhage Mother     Prostate cancer Father 79    No Known Problems Sister     No Known Problems Daughter     Breast cancer Maternal Grandmother 67    Colon cancer Maternal Grandfather 72    Lung cancer Maternal Grandfather 79    Lung cancer Paternal Grandmother     Alzheimer's disease Paternal Grandmother     Heart attack Paternal Grandfather     No Known Problems Brother     Crohn's disease Son     Ulcerative colitis Son     Breast cancer Paternal Aunt 61    No Known Problems Paternal Aunt     No Known Problems Paternal Aunt       Current Medications:     Current Outpatient Medications   Medication Sig Dispense Refill    clonazePAM (KlonoPIN) 0.5 mg tablet Take by mouth      lurasidone (LATUDA) 20 mg tablet TAKE 1 TABLET BY MOUTH AT SUPPER TIME      multivitamin (THERAGRAN) TABS Take 1 tablet by mouth daily      PARoxetine (PAXIL-CR) 25 mg 24 hr tablet TK 1 T PO HS  0     No current facility-administered medications for this visit. Allergies:     No Known Allergies   Physical Exam:     /88   Pulse 88   Temp 99.3 °F (37.4 °C) (Tympanic)   Resp 18   Ht 5' 5.25" (1.657 m)   Wt 84.9 kg (187 lb 3.2 oz)   LMP 10/10/2019 (Approximate)   SpO2 99%   BMI 30.91 kg/m²     Physical Exam  Vitals reviewed. Constitutional:       Appearance: Normal appearance. HENT:      Head: Normocephalic and atraumatic. Eyes:      General:         Right eye: No discharge. Left eye: No discharge. Conjunctiva/sclera: Conjunctivae normal.   Neck:      Vascular: No carotid bruit. Cardiovascular:      Rate and Rhythm: Normal rate and regular rhythm. Heart sounds: Normal heart sounds. No murmur heard. No friction rub. No gallop. Pulmonary:      Effort: Pulmonary effort is normal. No respiratory distress. Breath sounds: Normal breath sounds. No wheezing or rales. Abdominal:      General: Bowel sounds are normal. There is no distension. Palpations: Abdomen is soft. Tenderness: There is no abdominal tenderness. Musculoskeletal:         General: No swelling, tenderness or deformity. Normal range of motion. Cervical back: Normal range of motion and neck supple. No muscular tenderness. Lymphadenopathy:      Cervical: No cervical adenopathy. Neurological:      Mental Status: She is alert.    Psychiatric:         Mood and Affect: Mood normal.          Dmitriy Morrison MD  [address] [place] [address]

## 2023-12-15 NOTE — PATIENT INSTRUCTIONS
Wellness Visit for Adults   AMBULATORY CARE:   A wellness visit  is when you see your healthcare provider to get screened for health problems. Your healthcare provider will also give you advice on how to stay healthy. Write down your questions so you remember to ask them. Ask your healthcare provider how often you should have a wellness visit. What happens at a wellness visit:  Your healthcare provider will ask about your health, and your family history of health problems. This includes high blood pressure, heart disease, and cancer. He or she will ask if you have symptoms that concern you, if you smoke, and about your mood. You may also be asked about your intake of medicines, supplements, food, and alcohol. Any of the following may be done: Your weight  will be checked. Your height may also be checked so your body mass index (BMI) can be calculated. Your BMI shows if you are at a healthy weight. Your blood pressure  and heart rate will be checked. Your temperature may also be checked. Blood and urine tests  may be done. Blood tests may be done to check your cholesterol levels. Abnormal cholesterol levels increase your risk for heart disease and stroke. You may also need a blood or urine test to check for diabetes if you are at increased risk. Urine tests may be done to look for signs of an infection or kidney disease. A physical exam  includes checking your heartbeat and lungs with a stethoscope. Your healthcare provider may also check your skin to look for sun damage. Screening tests  may be recommended. A screening test is done to check for diseases that may not cause symptoms. The screening tests you may need depend on your age, gender, family history, and lifestyle habits. For example, colorectal screening may be recommended if you are 48years old or older. Screening tests you need if you are a woman:   A Pap smear  is used to screen for cervical cancer.  Pap smears are usually done every 3 to 5 years depending on your age. You may need them more often if you have had abnormal Pap smear test results in the past. Ask your healthcare provider how often you should have a Pap smear. A mammogram  is an x-ray of your breasts to screen for breast cancer. Experts recommend mammograms every 2 years starting at age 48 years. You may need a mammogram at age 52 years or younger if you have an increased risk for breast cancer. Talk to your healthcare provider about when you should start having mammograms and how often you need them. Vaccines you may need:   Get an influenza vaccine  every year. The influenza vaccine protects you from the flu. Several types of viruses cause the flu. The viruses change over time, so new vaccines are made each year. Get a tetanus-diphtheria (Td) booster vaccine  every 10 years. This vaccine protects you against tetanus and diphtheria. Tetanus is a severe infection that may cause painful muscle spasms and lockjaw. Diphtheria is a severe bacterial infection that causes a thick covering in the back of your mouth and throat. Get a human papillomavirus (HPV) vaccine  if you are female and aged 23 to 32 or male 23 to 24 and never received it. This vaccine protects you from HPV infection. HPV is the most common infection spread by sexual contact. HPV may also cause vaginal, penile, and anal cancers. Get a pneumococcal vaccine  if you are aged 72 years or older. The pneumococcal vaccine is an injection given to protect you from pneumococcal disease. Pneumococcal disease is an infection caused by pneumococcal bacteria. The infection may cause pneumonia, meningitis, or an ear infection. Get a shingles vaccine  if you are 60 or older, even if you have had shingles before. The shingles vaccine is an injection to protect you from the varicella-zoster virus. This is the same virus that causes chickenpox.  Shingles is a painful rash that develops in people who had chickenpox or have been exposed to the virus. How to eat healthy:  My Plate is a model for planning healthy meals. It shows the types and amounts of foods that should go on your plate. Fruits and vegetables make up about half of your plate, and grains and protein make up the other half. A serving of dairy is included on the side of your plate. The amount of calories and serving sizes you need depends on your age, gender, weight, and height. Examples of healthy foods are listed below:  Eat a variety of vegetables  such as dark green, red, and orange vegetables. You can also include canned vegetables low in sodium (salt) and frozen vegetables without added butter or sauces. Eat a variety of fresh fruits , canned fruit in 100% juice, frozen fruit, and dried fruit. Include whole grains. At least half of the grains you eat should be whole grains. Examples include whole-wheat bread, wheat pasta, brown rice, and whole-grain cereals such as oatmeal.    Eat a variety of protein foods such as seafood (fish and shellfish), lean meat, and poultry without skin (turkey and chicken). Examples of lean meats include pork leg, shoulder, or tenderloin, and beef round, sirloin, tenderloin, and extra lean ground beef. Other protein foods include eggs and egg substitutes, beans, peas, soy products, nuts, and seeds. Choose low-fat dairy products such as skim or 1% milk or low-fat yogurt, cheese, and cottage cheese. Limit unhealthy fats  such as butter, hard margarine, and shortening. Exercise:  Exercise at least 30 minutes per day on most days of the week. Some examples of exercise include walking, biking, dancing, and swimming. You can also fit in more physical activity by taking the stairs instead of the elevator or parking farther away from stores. Include muscle strengthening activities 2 days each week. Regular exercise provides many health benefits.  It helps you manage your weight, and decreases your risk for type 2 diabetes, heart disease, stroke, and high blood pressure. Exercise can also help improve your mood. Ask your healthcare provider about the best exercise plan for you. General health and safety guidelines:   Do not smoke. Nicotine and other chemicals in cigarettes and cigars can cause lung damage. Ask your healthcare provider for information if you currently smoke and need help to quit. E-cigarettes or smokeless tobacco still contain nicotine. Talk to your healthcare provider before you use these products. Limit alcohol. A drink of alcohol is 12 ounces of beer, 5 ounces of wine, or 1½ ounces of liquor. Lose weight, if needed. Being overweight increases your risk of certain health conditions. These include heart disease, high blood pressure, type 2 diabetes, and certain types of cancer. Protect your skin. Do not sunbathe or use tanning beds. Use sunscreen with a SPF 15 or higher. Apply sunscreen at least 15 minutes before you go outside. Reapply sunscreen every 2 hours. Wear protective clothing, hats, and sunglasses when you are outside. Drive safely. Always wear your seatbelt. Make sure everyone in your car wears a seatbelt. A seatbelt can save your life if you are in an accident. Do not use your cell phone when you are driving. This could distract you and cause an accident. Pull over if you need to make a call or send a text message. Practice safe sex. Use latex condoms if are sexually active and have more than one partner. Your healthcare provider may recommend screening tests for sexually transmitted infections (STIs). Wear helmets, lifejackets, and protective gear. Always wear a helmet when you ride a bike or motorcycle, go skiing, or play sports that could cause a head injury. Wear protective equipment when you play sports. Wear a lifejacket when you are on a boat or doing water sports.     © Copyright [employer] 2023 Information is for End User's use only and may not be sold, redistributed or otherwise used for commercial purposes. The above information is an  only. It is not intended as medical advice for individual conditions or treatments. Talk to your doctor, nurse or pharmacist before following any medical regimen to see if it is safe and effective for you.

## 2024-08-22 ENCOUNTER — HOSPITAL ENCOUNTER (OUTPATIENT)
Dept: RADIOLOGY | Age: 49
Discharge: HOME | End: 2024-08-22
Payer: COMMERCIAL

## 2024-08-22 DIAGNOSIS — Z12.31 ENCOUNTER FOR SCREENING MAMMOGRAM FOR MALIGNANT NEOPLASM OF BREAST: ICD-10-CM

## 2024-08-22 PROCEDURE — 77063 BREAST TOMOSYNTHESIS BI: CPT

## 2024-08-22 PROCEDURE — 77067 SCR MAMMO BI INCL CAD: CPT

## 2024-09-13 ENCOUNTER — NURSE TRIAGE (OUTPATIENT)
Age: 49
End: 2024-09-13

## 2024-09-13 NOTE — TELEPHONE ENCOUNTER
"Pt called in concerned for post menopausal bleeding. States she has not had her period in 5+ years. She experiencing red spotting on Monday, Tuesday, Wednesday. Denies any further bleeding. States it was with wiping, small amount seen in her underwear. Denies any clots or cramping. Pt states she feels well at this time. Reviewed EMB procedure with patient and she verbalized understanding. No available appointments in next two weeks. Warm transferred pt to [place] with GEORGINA Dunham.     Reason for Disposition   Age > 38 years with irregular or excessive bleeding    Answer Assessment - Initial Assessment Questions  1. AMOUNT: \"Describe the bleeding that you are having.\"     - SPOTTING: spotting, or pinkish / brownish mucous discharge; does not fill panti-liner or pad     - MILD:  less than 1 pad / hour; less than patient's usual menstrual bleeding    - MODERATE: 1-2 pads / hour; 1 menstrual cup every 6 hours; small-medium blood clots (e.g., pea, grape, small coin)    - SEVERE: soaking 2 or more pads/hour for 2 or more hours; 1 menstrual cup every 2 hours; bleeding not contained by pads or continuous red blood from vagina; large blood clots (e.g., golf ball, large coin)       Spotting, red  2. ONSET: \"When did the bleeding begin?\" \"Is it continuing now?\"      Monday, Tuesday, wednesday  3. MENSTRUAL PERIOD: \"When was the last normal menstrual period?\" \"How is this different than your period?\"      5+ years ago  4. REGULARITY: \"How regular are your periods?\"        5. ABDOMINAL PAIN: \"Do you have any pain?\" \"How bad is the pain?\"  (e.g., Scale 1-10; mild, moderate, or severe)    - MILD (1-3): doesn't interfere with normal activities, abdomen soft and not tender to touch     - MODERATE (4-7): interferes with normal activities or awakens from sleep, tender to touch     - SEVERE (8-10): excruciating pain, doubled over, unable to do any normal activities       denies  6. PREGNANCY: \"Could you be pregnant?\" \"Are you " "sexually active?\" \"Did you recently give birth?\"      denies  7. BREASTFEEDING: \"Are you breastfeeding?\"      denies  8. HORMONES: \"Are you taking any hormone medications, prescription or OTC?\" (e.g., birth control pills, estrogen)      denies  9. BLOOD THINNERS: \"Do you take any blood thinners?\" (e.g., Coumadin/warfarin, Pradaxa/dabigatran, aspirin)      denies  10. CAUSE: \"What do you think is causing the bleeding?\" (e.g., recent gyn surgery, recent gyn procedure; known bleeding disorder, cervical cancer, polycystic ovarian disease, fibroids)          unsure  11. HEMODYNAMIC STATUS: \"Are you weak or feeling lightheaded?\" If Yes, ask: \"Can you stand and walk normally?\"         denies  12. OTHER SYMPTOMS: \"What other symptoms are you having with the bleeding?\" (e.g., passed tissue, vaginal discharge, fever, menstrual-type cramps)        denies    Protocols used: Vaginal Bleeding - Abnormal-ADULT-OH    "

## 2024-10-01 ENCOUNTER — OFFICE VISIT (OUTPATIENT)
Dept: OBGYN CLINIC | Facility: CLINIC | Age: 49
End: 2024-10-01
Payer: COMMERCIAL

## 2024-10-01 VITALS — WEIGHT: 185.4 LBS | BODY MASS INDEX: 30.62 KG/M2 | SYSTOLIC BLOOD PRESSURE: 130 MMHG | DIASTOLIC BLOOD PRESSURE: 98 MMHG

## 2024-10-01 DIAGNOSIS — N95.0 PMB (POSTMENOPAUSAL BLEEDING): Primary | ICD-10-CM

## 2024-10-01 PROCEDURE — 99213 OFFICE O/P EST LOW 20 MIN: CPT | Performed by: OBSTETRICS & GYNECOLOGY

## 2024-10-01 NOTE — PROGRESS NOTES
Ambulatory Visit  Name: Eli Adrian      : 1975      MRN: [id number]  Encounter Provider: Carolina Avila MD  Encounter Date: 10/1/2024   Encounter department: [facility] [place]    Assessment & Plan  PMB (postmenopausal bleeding)  Reviewed differential dx of PMB and workup. Recommend US to check EMS. Ordered today and will call her with results. If EMS > 4mm or she has repetitive episodes or bleeding, recommend tissue sampling either via EMB or hysteroscopy, D&C depending on appearance of EMS on US. Pap normal and cervix appears normal on exam. Not on HRT. We did discuss her hot flashes and currently she feels things are manageable.   Orders:    US pelvis complete w transvaginal; Future        History of Present Illness     Eli Adrian is a 49 y.o. female who presents with PMB    Patient reports PMB on 2024 for 3 days. She states it is with wiping and some spotting on her underwear. Denies clotting and cramping.     2021: NILM, neg HPV  2024: mammogram birads -1       Review of Systems        Objective     /98 (BP Location: Left arm, Patient Position: Sitting, Cuff Size: Standard)   Wt 84.1 kg (185 lb 6.4 oz)   LMP 10/10/2019 (Approximate)   BMI 30.62 kg/m²     Physical Exam  Vitals and nursing note reviewed.   Constitutional:       General: She is not in acute distress.  Pulmonary:      Effort: Pulmonary effort is normal. No respiratory distress.   Genitourinary:     General: Normal vulva.      Exam position: Lithotomy position.      Vagina: Normal. No bleeding or lesions.      Cervix: Normal. No friability, lesion or cervical bleeding.      Uterus: Normal.       Adnexa: Right adnexa normal and left adnexa normal.   Skin:     General: Skin is warm and dry.   Neurological:      Mental Status: She is alert. Mental status is at baseline.

## 2024-10-01 NOTE — ASSESSMENT & PLAN NOTE
Reviewed differential dx of PMB and workup. Recommend US to check EMS. Ordered today and will call her with results. If EMS > 4mm or she has repetitive episodes or bleeding, recommend tissue sampling either via EMB or hysteroscopy, D&C depending on appearance of EMS on US. Pap normal and cervix appears normal on exam. Not on HRT. We did discuss her hot flashes and currently she feels things are manageable.   Orders:    US pelvis complete w transvaginal; Future

## 2024-10-17 ENCOUNTER — HOSPITAL ENCOUNTER (OUTPATIENT)
Dept: RADIOLOGY | Facility: HOSPITAL | Age: 49
Discharge: HOME | End: 2024-10-17
Attending: OBSTETRICS & GYNECOLOGY
Payer: COMMERCIAL

## 2024-10-17 DIAGNOSIS — N95.0 PMB (POSTMENOPAUSAL BLEEDING): ICD-10-CM

## 2024-10-17 PROCEDURE — 76830 TRANSVAGINAL US NON-OB: CPT

## 2024-10-17 PROCEDURE — 76856 US EXAM PELVIC COMPLETE: CPT

## 2024-10-20 ENCOUNTER — HOSPITAL ENCOUNTER (OUTPATIENT)
Dept: RADIOLOGY | Facility: HOSPITAL | Age: 49
Discharge: HOME | End: 2024-10-20
Payer: COMMERCIAL

## 2024-10-20 DIAGNOSIS — R92.30 DENSE BREAST TISSUE: ICD-10-CM

## 2024-10-20 DIAGNOSIS — Z80.3 FAMILY HISTORY OF BREAST CANCER: ICD-10-CM

## 2024-10-20 DIAGNOSIS — Z91.89 AT HIGH RISK FOR BREAST CANCER: ICD-10-CM

## 2024-10-20 DIAGNOSIS — Z12.39 ENCOUNTER FOR BREAST CANCER SCREENING OTHER THAN MAMMOGRAM: ICD-10-CM

## 2024-10-20 PROCEDURE — G1004 CDSM NDSC: HCPCS

## 2024-10-20 PROCEDURE — C8908 MRI W/O FOL W/CONT, BREAST,: HCPCS

## 2024-10-20 PROCEDURE — 77049 MRI BREAST C-+ W/CAD BI: CPT

## 2024-10-20 PROCEDURE — A9585 GADOBUTROL INJECTION: HCPCS | Performed by: OBSTETRICS & GYNECOLOGY

## 2024-10-20 RX ORDER — GADOBUTROL 604.72 MG/ML
8 INJECTION INTRAVENOUS
Status: COMPLETED | OUTPATIENT
Start: 2024-10-20 | End: 2024-10-20

## 2024-10-20 RX ADMIN — GADOBUTROL 8 ML: 604.72 INJECTION INTRAVENOUS at 13:24

## 2024-10-22 ENCOUNTER — TELEPHONE (OUTPATIENT)
Age: 49
End: 2024-10-22

## 2024-10-22 NOTE — TELEPHONE ENCOUNTER
----- Message from Carolina Avila MD sent at 10/22/2024 12:51 PM EDT -----  Please see note below. Please add her onto my schedule for EMB. Okay for 15 min if you can't find room (can add on tomorrow)

## 2024-10-22 NOTE — TELEPHONE ENCOUNTER
Attempt 1- MYC msg from provider- unread  Attempt 2- Left voice message to call back to review results     Desk to try to add her on tomorrow for 15min

## 2024-10-23 ENCOUNTER — PROCEDURE VISIT (OUTPATIENT)
Dept: OBGYN CLINIC | Facility: CLINIC | Age: 49
End: 2024-10-23
Payer: COMMERCIAL

## 2024-10-23 VITALS — BODY MASS INDEX: 30.95 KG/M2 | WEIGHT: 187.4 LBS | DIASTOLIC BLOOD PRESSURE: 70 MMHG | SYSTOLIC BLOOD PRESSURE: 110 MMHG

## 2024-10-23 DIAGNOSIS — N95.0 PMB (POSTMENOPAUSAL BLEEDING): Primary | ICD-10-CM

## 2024-10-23 PROCEDURE — 99213 OFFICE O/P EST LOW 20 MIN: CPT | Performed by: OBSTETRICS & GYNECOLOGY

## 2024-10-23 PROCEDURE — 88305 TISSUE EXAM BY PATHOLOGIST: CPT | Performed by: STUDENT IN AN ORGANIZED HEALTH CARE EDUCATION/TRAINING PROGRAM

## 2024-10-23 PROCEDURE — 58100 BIOPSY OF UTERUS LINING: CPT | Performed by: OBSTETRICS & GYNECOLOGY

## 2024-10-23 NOTE — PROGRESS NOTES
"Ambulatory Visit  Name: Eli Adrian      : 1975      MRN: [id number]  Encounter Provider: Carolina Avila MD  Encounter Date: 10/23/2024   Encounter department: [facility] [place]    Assessment & Plan  PMB (postmenopausal bleeding)  I reviewed the images showing homogenous 8 mm EMS. Discussed that endometrial sampling is recommended. Reviewed 5% sampling with EMB vs 95% sampling with hysteroscopy, D&C. Given lining is homogenous, recommend proceeding with EMB. Will call with results. If EMB is negative and she has recurrent bleeding, recommend sampling in the OR and she was counseled to call at that time.  Orders:    Tissue Exam    Endometrial biopsy    Date/Time: 10/23/2024 11:30 AM    Performed by: Carolina Avila MD  Authorized by: Carolina Avila MD  Universal Protocol:  Consent: Verbal consent obtained.  Risks and benefits: risks, benefits and alternatives were discussed  Consent given by: patient  Time out: Immediately prior to procedure a \"time out\" was called to verify the correct patient, procedure, equipment, support staff and site/side marked as required.  Patient understanding: patient states understanding of the procedure being performed  Relevant documents: relevant documents present and verified  Patient identity confirmed: verbally with patient    Indication:     Indications: Post-menopausal bleeding    Procedure:     Procedure: endometrial biopsy with Pipelle      A bivalve speculum was placed in the vagina: yes      Cervix cleaned and prepped: yes      Uterus sounded: yes      Uterus sound depth (cm):  7    Specimen collected: specimen collected and sent to pathology      Patient tolerated procedure well with no complications: yes    Findings:     Uterus size:  Non-gravid    Cervix: normal      Adnexa: normal          History of Present Illness     Eli Adrian is a 49 y.o. female who presents for f/u of US for " PMB  Patient reports PMB on 09/19/2024 for 3 days. She states it is with wiping and some spotting on her underwear. Denies clotting and cramping. U shows homogenous 8mm EMS     4/2021: NILM, neg HPV  8/2024: mammogram birads -1      Review of Systems        Objective     /70 (BP Location: Left arm, Patient Position: Sitting, Cuff Size: Standard)   Wt 85 kg (187 lb 6.4 oz)   LMP 10/10/2019 (Approximate)   BMI 30.95 kg/m²     Physical Exam  Vitals and nursing note reviewed.   Constitutional:       General: She is not in acute distress.  Pulmonary:      Effort: Pulmonary effort is normal. No respiratory distress.   Genitourinary:     General: Normal vulva.      Exam position: Lithotomy position.      Vagina: Normal.      Cervix: Normal.      Uterus: Normal.       Adnexa: Right adnexa normal and left adnexa normal.   Skin:     General: Skin is warm and dry.   Neurological:      Mental Status: She is alert. Mental status is at baseline.

## 2024-10-23 NOTE — ASSESSMENT & PLAN NOTE
I reviewed the images showing homogenous 8 mm EMS. Discussed that endometrial sampling is recommended. Reviewed 5% sampling with EMB vs 95% sampling with hysteroscopy, D&C. Given lining is homogenous, recommend proceeding with EMB. Will call with results. If EMB is negative and she has recurrent bleeding, recommend sampling in the OR and she was counseled to call at that time.  Orders:    Tissue Exam

## 2024-10-28 PROCEDURE — 88305 TISSUE EXAM BY PATHOLOGIST: CPT | Performed by: STUDENT IN AN ORGANIZED HEALTH CARE EDUCATION/TRAINING PROGRAM

## 2025-01-16 ENCOUNTER — ANNUAL EXAM (OUTPATIENT)
Dept: OBGYN CLINIC | Facility: CLINIC | Age: 50
End: 2025-01-16
Payer: COMMERCIAL

## 2025-01-16 VITALS
SYSTOLIC BLOOD PRESSURE: 134 MMHG | WEIGHT: 184.6 LBS | HEIGHT: 66 IN | BODY MASS INDEX: 29.67 KG/M2 | DIASTOLIC BLOOD PRESSURE: 86 MMHG

## 2025-01-16 DIAGNOSIS — Z12.39 ENCOUNTER FOR BREAST CANCER SCREENING OTHER THAN MAMMOGRAM: ICD-10-CM

## 2025-01-16 DIAGNOSIS — Z12.31 ENCOUNTER FOR SCREENING MAMMOGRAM FOR MALIGNANT NEOPLASM OF BREAST: ICD-10-CM

## 2025-01-16 DIAGNOSIS — R92.30 DENSE BREAST TISSUE: ICD-10-CM

## 2025-01-16 DIAGNOSIS — Z01.419 ENCOUNTER FOR ANNUAL ROUTINE GYNECOLOGICAL EXAMINATION: Primary | ICD-10-CM

## 2025-01-16 PROCEDURE — S0612 ANNUAL GYNECOLOGICAL EXAMINA: HCPCS | Performed by: PHYSICIAN ASSISTANT

## 2025-01-16 NOTE — PROGRESS NOTES
Assessment   49 y.o. postmenopausal female presenting for annual exam.     Plan   Diagnoses and all orders for this visit:    Encounter for annual routine gynecological examination    Normal findings on routine exam.  Encouraged 150 min of exercise per week.  Reviewed balanced diet.   Multivitamin encouraged   Breast awareness/SBE encouraged    Reviewed PMB precautions.     Encounter for screening mammogram for malignant neoplasm of breast  -     Mammo screening bilateral w 3d and cad; Future    Encounter for breast cancer screening other than mammogram  -     MRI breast bilateral w and wo contrast w cad; Future    Dense breast tissue  -     MRI breast bilateral w and wo contrast w cad; Future    Discussed that based on her lifetime risk and breast density she may benefit from additional screening with breast MRI - to be completed 6 months from screening mammogram. Discussed this is not always covered by insurance and I recommend checking coverage first to determine potential out of pocket cost. We reviewed risk, benefit, and limitations of imaging. She will consider this and is aware this has been ordered.         Pap - due 2026   Mammo slip given   Colonoscopy due 2029  DEXA due at 59 (social smoker)    RTO one year for yearly exam or sooner as needed.      __________________________________________________________________      Subjective     Eli Adrian is a 49 y.o. postmenopausal female presenting for annual exam.     Daughter Savannah is 16 and driving! Her son is 19 and working as a .   Not exercising regularly - low motivation.     SCREENING  Last Pap: 04/07/2021 NILM/hpv neg  Last Mammo: 08/22/2024 BIRADS 1 - Negative, LTC 23.5, heterogeneously dense tissue.  Last breast MRI 10/20/2024 BIRADS 2 - Benign  Last Colonoscopy: 04/13/2022  7 year recall    GYN  Episode of PMB bleeding this last year. EMB 10/23/2026 benign. Aware to call with recurrence as would recommend hysteroscopy D&C.     Denies  postmenopausal vaginal bleeding, dryness, vaginal discharge, itching, odor, pelvic pain and vulvar/vaginal symptoms    Sexually active: with her   Concerns: denies pain, bleeding, dryness     Hx Abnormal pap: distant hx of abn - neg on repeat   We reviewed ASCCP guidelines for Pap testing today.       OB        Complaints: denies  Denies leakage/difficulty urinating, dysuria, hematuria, and urinary frequency/urgency    BREAST  Complaints: denies  Denies: breast lump, breast tenderness, dryness, nipple discharge, pruritus, skin color change, and skin lesion(s)    Pertinent Family Hx:   Family hx of breast cancer: MGM, paternal aunt  Family hx of ovarian cancer: no  Family hx of colon cancer: MGF      Past Medical History:   Diagnosis Date    Allergy     Anxiety     Anxiety     Depression     Dermatitis     Dysplastic nevus     Insomnia     Menometrorrhagia     Vertigo        Past Surgical History:   Procedure Laterality Date    BREAST EXCISIONAL BIOPSY Left     COLPOSCOPY VULVA W/ BIOPSY      ENDOMETRIAL BIOPSY      SKIN BIOPSY      each additional lesions         Current Outpatient Medications:     clonazePAM (KlonoPIN) 0.5 mg tablet, Take by mouth, Disp: , Rfl:     lurasidone (LATUDA) 20 mg tablet, TAKE 1 TABLET BY MOUTH AT SUPPER TIME, Disp: , Rfl:     multivitamin (THERAGRAN) TABS, Take 1 tablet by mouth daily, Disp: , Rfl:     PARoxetine (PAXIL-CR) 25 mg 24 hr tablet, TK 1 T PO HS, Disp: , Rfl: 0    No Known Allergies    Social History     Socioeconomic History    Marital status: /Civil Union     Spouse name: Not on file    Number of children: Not on file    Years of education: Not on file    Highest education level: Not on file   Occupational History    Not on file   Tobacco Use    Smoking status: Some Days     Current packs/day: 0.00     Average packs/day: 0.3 packs/day for 5.2 years (1.3 ttl pk-yrs)     Types: Cigarettes     Start date: 1994     Last attempt to quit:  "1999     Years since quittin.0     Passive exposure: Never    Smokeless tobacco: Former    Tobacco comments:     social   Vaping Use    Vaping status: Never Used   Substance and Sexual Activity    Alcohol use: Yes     Alcohol/week: 3.0 standard drinks of alcohol     Types: 3 Cans of beer per week     Comment: social    Drug use: No    Sexual activity: Yes     Partners: Male     Birth control/protection: Post-menopausal, None   Other Topics Concern    Not on file   Social History Narrative    Caffeine use    Exercises regularly     Social Drivers of Health     Financial Resource Strain: Not on file   Food Insecurity: Not on file   Transportation Needs: Not on file   Physical Activity: Not on file   Stress: Not on file   Social Connections: Not on file   Intimate Partner Violence: Not on file   Housing Stability: Not on file         Review of Systems   Constitutional: Negative.   Respiratory: Negative.    Cardiovascular: Negative   Gastrointestinal: Negative   Breasts: As noted above.   Genitourinary: As noted above.   Psychiatric: Negative       Objective      /86 (BP Location: Left arm, Patient Position: Sitting, Cuff Size: Standard)   Ht 5' 5.75\" (1.67 m)   Wt 83.7 kg (184 lb 9.6 oz)   LMP 10/10/2019 (Approximate)   BMI 30.02 kg/m²     Physical Examination:  Patient appears well and is not in distress  Breasts are symmetrical without mass, tenderness, nipple discharge, skin changes or adenopathy.   Abdomen is soft and nontender without masses.   External genitals are normal without lesions or rashes.  Urethral meatus and urethra are normal  Bladder is normal to palpation  Vagina is normal without discharge or bleeding.   Cervix is normal without discharge or lesion.   Uterus is normal, mobile, nontender without palpable mass.  Adnexa are normal, nontender, without palpable mass.   "

## 2025-02-21 ENCOUNTER — OFFICE VISIT (OUTPATIENT)
Dept: FAMILY MEDICINE CLINIC | Facility: CLINIC | Age: 50
End: 2025-02-21
Payer: COMMERCIAL

## 2025-02-21 VITALS
SYSTOLIC BLOOD PRESSURE: 102 MMHG | HEIGHT: 66 IN | OXYGEN SATURATION: 99 % | WEIGHT: 180 LBS | TEMPERATURE: 99.2 F | DIASTOLIC BLOOD PRESSURE: 60 MMHG | RESPIRATION RATE: 16 BRPM | BODY MASS INDEX: 28.93 KG/M2 | HEART RATE: 98 BPM

## 2025-02-21 DIAGNOSIS — J20.9 BRONCHITIS WITH BRONCHOSPASM: Primary | ICD-10-CM

## 2025-02-21 LAB
SARS-COV-2 AG UPPER RESP QL IA: NEGATIVE
VALID CONTROL: NORMAL

## 2025-02-21 PROCEDURE — 99213 OFFICE O/P EST LOW 20 MIN: CPT | Performed by: FAMILY MEDICINE

## 2025-02-21 PROCEDURE — 87811 SARS-COV-2 COVID19 W/OPTIC: CPT | Performed by: FAMILY MEDICINE

## 2025-02-21 RX ORDER — BENZONATATE 200 MG/1
200 CAPSULE ORAL 3 TIMES DAILY PRN
Qty: 20 CAPSULE | Refills: 0 | Status: SHIPPED | OUTPATIENT
Start: 2025-02-21

## 2025-02-21 RX ORDER — AZITHROMYCIN 250 MG/1
TABLET, FILM COATED ORAL
Qty: 6 TABLET | Refills: 0 | Status: SHIPPED | OUTPATIENT
Start: 2025-02-21 | End: 2025-02-25

## 2025-02-21 RX ORDER — PREDNISONE 10 MG/1
TABLET ORAL
Qty: 28 TABLET | Refills: 0 | Status: SHIPPED | OUTPATIENT
Start: 2025-02-21

## 2025-02-21 RX ORDER — ALBUTEROL SULFATE 90 UG/1
2 INHALANT RESPIRATORY (INHALATION) EVERY 6 HOURS PRN
Qty: 8 G | Refills: 0 | Status: CANCELLED | OUTPATIENT
Start: 2025-02-21

## 2025-02-21 NOTE — PROGRESS NOTES
"Name: Eli Adrian      : 1975      MRN: [id number]  Encounter Provider: Brigido Horton MD  Encounter Date: 2025   Encounter department: [facility]  :  Assessment & Plan  Bronchitis with bronchospasm    Rapid covid negative in office today.   A lot of fluids and rest. Tylenol or motrin for fever or pain.   Give zpack. Side effects educated patient.  Give prednisone. SE educated pt.   Give cough medication. Side effects educated patient.   Pt refused albuterol.   Call office if symptoms no improving or worse.         Orders:    Poct Covid 19 Rapid Antigen Test           History of Present Illness   HPI    PT is here by herself.     C/o cough for 1 week.  Hot and cold.   Cough with clear mucous.   Wheezing.   Denies SOB, CP.   Denies n/v/abd pain.   Social smoke.    is sick also. Did not do Home Covid test.     Review of Systems   Constitutional:  Negative for appetite change, chills and fever.   HENT:  Negative for congestion, ear pain, sinus pain and sore throat.    Eyes:  Negative for discharge and itching.   Respiratory:  Positive for cough and wheezing. Negative for apnea, chest tightness and shortness of breath.    Cardiovascular:  Negative for chest pain, palpitations and leg swelling.   Gastrointestinal:  Negative for abdominal pain, anal bleeding, constipation, diarrhea, nausea and vomiting.   Endocrine: Negative for cold intolerance, heat intolerance and polyuria.   Genitourinary:  Negative for difficulty urinating and dysuria.   Musculoskeletal:  Negative for arthralgias, back pain and myalgias.   Skin:  Negative for rash.   Neurological:  Negative for dizziness and headaches.   Psychiatric/Behavioral:  Negative for agitation.        Objective   /60 (BP Location: Left arm, Patient Position: Sitting, Cuff Size: Standard)   Pulse 98   Temp 99.2 °F (37.3 °C) (Tympanic)   Resp 16   Ht 5' 5.75\" (1.67 m)   Wt 81.6 kg (180 lb)   LMP 10/10/2019 (Approximate)   " SpO2 99%   BMI 29.27 kg/m²      Physical Exam  Constitutional:       General: She is not in acute distress.     Appearance: She is well-developed.   HENT:      Head: Normocephalic.      Right Ear: Tympanic membrane normal.      Left Ear: Tympanic membrane normal.   Eyes:      General:         Right eye: No discharge.         Left eye: No discharge.      Conjunctiva/sclera: Conjunctivae normal.   Neck:      Thyroid: No thyromegaly.   Cardiovascular:      Rate and Rhythm: Normal rate and regular rhythm.      Heart sounds: Normal heart sounds. No murmur heard.     No friction rub. No gallop.   Pulmonary:      Effort: Pulmonary effort is normal. No respiratory distress.      Breath sounds: Wheezing present. No rales.   Chest:      Chest wall: No tenderness.   Abdominal:      General: Bowel sounds are normal. There is no distension.      Palpations: Abdomen is soft. There is no mass.      Tenderness: There is no abdominal tenderness. There is no guarding or rebound.   Musculoskeletal:         General: Normal range of motion.      Cervical back: Normal range of motion.   Lymphadenopathy:      Cervical: No cervical adenopathy.   Neurological:      Mental Status: She is alert.

## 2025-03-24 ENCOUNTER — OFFICE VISIT (OUTPATIENT)
Dept: FAMILY MEDICINE CLINIC | Facility: CLINIC | Age: 50
End: 2025-03-24
Payer: COMMERCIAL

## 2025-03-24 VITALS
SYSTOLIC BLOOD PRESSURE: 128 MMHG | WEIGHT: 181.6 LBS | HEART RATE: 89 BPM | RESPIRATION RATE: 18 BRPM | TEMPERATURE: 98 F | HEIGHT: 66 IN | OXYGEN SATURATION: 99 % | BODY MASS INDEX: 29.18 KG/M2 | DIASTOLIC BLOOD PRESSURE: 76 MMHG

## 2025-03-24 DIAGNOSIS — F41.8 DEPRESSION WITH ANXIETY: ICD-10-CM

## 2025-03-24 DIAGNOSIS — G56.03 BILATERAL CARPAL TUNNEL SYNDROME: ICD-10-CM

## 2025-03-24 DIAGNOSIS — Z00.00 ANNUAL PHYSICAL EXAM: Primary | ICD-10-CM

## 2025-03-24 PROCEDURE — 99396 PREV VISIT EST AGE 40-64: CPT | Performed by: FAMILY MEDICINE

## 2025-03-24 NOTE — ASSESSMENT & PLAN NOTE
Continue current meds per psychiatry.     Orders:    CBC and differential; Future    Comprehensive metabolic panel; Future    Lipid Panel with Direct LDL reflex; Future    TSH, 3rd generation with Free T4 reflex; Future

## 2025-03-24 NOTE — PROGRESS NOTES
Adult Annual Physical  Name: Eli Adrian      : 1975      MRN: [id number]  Encounter Provider: Brigido Horton MD  Encounter Date: 3/24/2025   Encounter department: [facility]    Assessment & Plan  Annual physical exam    Orders:    CBC and differential; Future    Comprehensive metabolic panel; Future    Lipid Panel with Direct LDL reflex; Future    TSH, 3rd generation with Free T4 reflex; Future    Depression with anxiety  Continue current meds per psychiatry.     Orders:    CBC and differential; Future    Comprehensive metabolic panel; Future    Lipid Panel with Direct LDL reflex; Future    TSH, 3rd generation with Free T4 reflex; Future    Bilateral carpal tunnel syndrome  Advised pt to use splint at night. Pt refused to see orthopedics now. Will call office if she decide to.          Refused flu shot.   Refused covid19 shots.   Refused shots per pt.   HIV screen done before.   Pap  negative per GYN.   Mammogram and MRI regularly. FU GYN.   Colonoscopy 2022, repeat in 7 years.   RTO in 1 year.         Preventive Screenings:  - Diabetes Screening: orders placed  - Cholesterol Screening: orders placed   - Cervical cancer screening: screening up-to-date   - Breast cancer screening: screening up-to-date   - Colon cancer screening: screening up-to-date   - Lung cancer screening: screening not indicated     Immunizations:  - Immunizations due: Influenza, Prevnar 20, Tdap and Zoster (Shingrix)    Counseling/Anticipatory Guidance:  - Alcohol: discussed moderation in alcohol intake and recommendations for healthy alcohol use.   - Drug use: discussed harms of illicit drug use and how it can negatively impact mental/physical health.   - Tobacco use: discussed harms of tobacco use and management options for quitting.   - Dental health: discussed importance of regular tooth brushing, flossing, and dental visits.   - Sexual health: discussed sexually transmitted diseases, partner selection,  use of condoms, avoidance of unintended pregnancy, and contraceptive alternatives.   - Diet: discussed recommendations for a healthy/well-balanced diet.   - Exercise: the importance of regular exercise/physical activity was discussed. Recommend exercise 3-5 times per week for at least 30 minutes.   - Injury prevention: discussed safety/seat belts, safety helmets, smoke detectors, carbon monoxide detectors, and smoking near bedding or upholstery.          History of Present Illness       Pt is here by herself.     C/o b/l fingers numbness/tingling for a while.   Worse last month.   Left side if worse.     Depression/anxiety--- psychiatrist Dr. Fajardo Q 3 months. She is on paxil and latuda which helped.      Social smoker, alcohol per pt.   No drugs.      Adult Annual Physical:  Patient presents for annual physical.     Diet and Physical Activity:  - Diet/Nutrition: no special diet.  - Exercise: no formal exercise.    Depression Screening:    - PHQ-9 Score: 0    General Health:  - Sleep: sleeps well and 7-8 hours of sleep on average.  - Hearing: normal hearing right ear and normal hearing left ear.  - Vision: most recent eye exam < 1 year ago.  - Dental: regular dental visits.    /GYN Health:  - Follows with GYN: yes.   - Menopause: premenopausal.   - History of STDs: no    Advanced Care Planning:  - Has an advanced directive?: yes    - Has a durable medical POA?: no      Review of Systems   Constitutional:  Negative for appetite change, chills and fever.   HENT:  Negative for congestion, ear pain, sinus pain and sore throat.    Eyes:  Negative for discharge and itching.   Respiratory:  Negative for apnea, cough, chest tightness, shortness of breath and wheezing.    Cardiovascular:  Negative for chest pain, palpitations and leg swelling.   Gastrointestinal:  Negative for abdominal pain, anal bleeding, constipation, diarrhea, nausea and vomiting.   Endocrine: Negative for cold intolerance, heat intolerance and  "polyuria.   Genitourinary:  Negative for difficulty urinating and dysuria.   Musculoskeletal:  Negative for arthralgias, back pain and myalgias.   Skin:  Negative for rash.   Neurological:  Positive for numbness. Negative for dizziness and headaches.   Psychiatric/Behavioral:  Negative for agitation.          Objective   /76   Pulse 89   Temp 98 °F (36.7 °C) (Tympanic)   Resp 18   Ht 5' 5.75\" (1.67 m)   Wt 82.4 kg (181 lb 9.6 oz)   LMP 10/10/2019 (Approximate)   SpO2 99%   BMI 29.53 kg/m²     Physical Exam  Constitutional:       General: She is not in acute distress.     Appearance: She is well-developed.   HENT:      Head: Normocephalic.   Eyes:      General:         Right eye: No discharge.         Left eye: No discharge.      Conjunctiva/sclera: Conjunctivae normal.   Neck:      Thyroid: No thyromegaly.   Cardiovascular:      Rate and Rhythm: Normal rate and regular rhythm.      Heart sounds: Normal heart sounds. No murmur heard.     No friction rub. No gallop.   Pulmonary:      Effort: Pulmonary effort is normal. No respiratory distress.      Breath sounds: Normal breath sounds. No wheezing or rales.   Chest:      Chest wall: No tenderness.   Abdominal:      General: Bowel sounds are normal. There is no distension.      Palpations: Abdomen is soft. There is no mass.      Tenderness: There is no abdominal tenderness. There is no guarding or rebound.   Musculoskeletal:         General: No tenderness or deformity. Normal range of motion.      Cervical back: Normal range of motion.      Comments: Positive for phalen and tinel test   Lymphadenopathy:      Cervical: No cervical adenopathy.   Neurological:      Mental Status: She is alert.         "

## 2025-03-24 NOTE — ASSESSMENT & PLAN NOTE
Advised pt to use splint at night. Pt refused to see orthopedics now. Will call office if she decide to.

## 2025-03-24 NOTE — PATIENT INSTRUCTIONS
"Patient Education     Routine physical for adults   The Basics   Written by the doctors and editors at [facility]   What is a physical? -- A physical is a routine visit, or \"check-up,\" with your doctor. You might also hear it called a \"wellness visit\" or \"preventive visit.\"  During each visit, the doctor will:   Ask about your physical and mental health   Ask about your habits, behaviors, and lifestyle   Do an exam   Give you vaccines if needed   Talk to you about any medicines you take   Give advice about your health   Answer your questions  Getting regular check-ups is an important part of taking care of your health. It can help your doctor find and treat any problems you have. But it's also important for preventing health problems.  A routine physical is different from a \"sick visit.\" A sick visit is when you see a doctor because of a health concern or problem. Since physicals are scheduled ahead of time, you can think about what you want to ask the doctor.  How often should I get a physical? -- It depends on your age and health. In general, for people age 21 years and older:   If you are younger than 50 years, you might be able to get a physical every 3 years.   If you are 50 years or older, your doctor might recommend a physical every year.  If you have an ongoing health condition, like diabetes or high blood pressure, your doctor will probably want to see you more often.  What happens during a physical? -- In general, each visit will include:   Physical exam - The doctor or nurse will check your height, weight, heart rate, and blood pressure. They will also look at your eyes and ears. They will ask about how you are feeling and whether you have any symptoms that bother you.   Medicines - It's a good idea to bring a list of all the medicines you take to each doctor visit. Your doctor will talk to you about your medicines and answer any questions. Tell them if you are having any side effects that bother you. You " "should also tell them if you are having trouble paying for any of your medicines.   Habits and behaviors - This includes:   Your diet   Your exercise habits   Whether you smoke, drink alcohol, or use drugs   Whether you are sexually active   Whether you feel safe at home  Your doctor will talk to you about things you can do to improve your health and lower your risk of health problems. They will also offer help and support. For example, if you want to quit smoking, they can give you advice and might prescribe medicines. If you want to improve your diet or get more physical activity, they can help you with this, too.   Lab tests, if needed - The tests you get will depend on your age and situation. For example, your doctor might want to check your:   Cholesterol   Blood sugar   Iron level   Vaccines - The recommended vaccines will depend on your age, health, and what vaccines you already had. Vaccines are very important because they can prevent certain serious or deadly infections.   Discussion of screening - \"Screening\" means checking for diseases or other health problems before they cause symptoms. Your doctor can recommend screening based on your age, risk, and preferences. This might include tests to check for:   Cancer, such as breast, prostate, cervical, ovarian, colorectal, prostate, lung, or skin cancer   Sexually transmitted infections, such as chlamydia and gonorrhea   Mental health conditions like depression and anxiety  Your doctor will talk to you about the different types of screening tests. They can help you decide which screenings to have. They can also explain what the results might mean.   Answering questions - The physical is a good time to ask the doctor or nurse questions about your health. If needed, they can refer you to other doctors or specialists, too.  Adults older than 65 years often need other care, too. As you get older, your doctor will talk to you about:   How to prevent falling at " home   Hearing or vision tests   Memory testing   How to take your medicines safely   Making sure that you have the help and support you need at home  All topics are updated as new evidence becomes available and our peer review process is complete.  This topic retrieved from [employer] on: May 02, 2024.  Topic 730140 Version 1.0  Release: 32.4.3 - C32.122  © 2024 [employer] and/or its affiliates. All rights reserved.  Consumer Information Use and Disclaimer   Disclaimer: This generalized information is a limited summary of diagnosis, treatment, and/or medication information. It is not meant to be comprehensive and should be used as a tool to help the user understand and/or assess potential diagnostic and treatment options. It does NOT include all information about conditions, treatments, medications, side effects, or risks that may apply to a specific patient. It is not intended to be medical advice or a substitute for the medical advice, diagnosis, or treatment of a health care provider based on the health care provider's examination and assessment of a patient's specific and unique circumstances. Patients must speak with a health care provider for complete information about their health, medical questions, and treatment options, including any risks or benefits regarding use of medications. This information does not endorse any treatments or medications as safe, effective, or approved for treating a specific patient. [employer] and its affiliates disclaim any warranty or liability relating to this information or the use thereof.The use of this information is governed by the Terms of Use, available at https://www.[employer]uwer.com/en/know/clinical-effectiveness-terms. 2024© [employer]. and its affiliates and/or licensors. All rights reserved.  Copyright   © 2024 [employer] and/or its affiliates. All rights reserved.